# Patient Record
Sex: FEMALE | Race: WHITE | URBAN - METROPOLITAN AREA
[De-identification: names, ages, dates, MRNs, and addresses within clinical notes are randomized per-mention and may not be internally consistent; named-entity substitution may affect disease eponyms.]

---

## 2017-05-12 ENCOUNTER — OFFICE VISIT (OUTPATIENT)
Dept: FAMILY MEDICINE CLINIC | Age: 57
End: 2017-05-12

## 2017-05-12 VITALS
WEIGHT: 293 LBS | RESPIRATION RATE: 12 BRPM | HEART RATE: 93 BPM | TEMPERATURE: 98.5 F | OXYGEN SATURATION: 97 % | BODY MASS INDEX: 48.34 KG/M2 | SYSTOLIC BLOOD PRESSURE: 130 MMHG | DIASTOLIC BLOOD PRESSURE: 100 MMHG

## 2017-05-12 DIAGNOSIS — J45.21 ASTHMATIC BRONCHITIS, MILD INTERMITTENT, WITH ACUTE EXACERBATION: ICD-10-CM

## 2017-05-12 DIAGNOSIS — J01.10 ACUTE FRONTAL SINUSITIS, RECURRENCE NOT SPECIFIED: Primary | ICD-10-CM

## 2017-05-12 PROCEDURE — 99213 OFFICE O/P EST LOW 20 MIN: CPT | Performed by: FAMILY MEDICINE

## 2017-05-12 RX ORDER — CEPHALEXIN 500 MG/1
500 CAPSULE ORAL 3 TIMES DAILY
Qty: 30 CAPSULE | Refills: 0 | Status: SHIPPED | OUTPATIENT
Start: 2017-05-12 | End: 2017-10-05 | Stop reason: SDUPTHER

## 2017-05-12 RX ORDER — PREDNISONE 10 MG/1
TABLET ORAL
Qty: 27 TABLET | Refills: 0 | Status: SHIPPED | OUTPATIENT
Start: 2017-05-12 | End: 2017-05-17

## 2017-05-12 ASSESSMENT — ENCOUNTER SYMPTOMS
SORE THROAT: 1
WHEEZING: 1
COUGH: 1

## 2017-05-23 RX ORDER — LEVOFLOXACIN 500 MG/1
500 TABLET, FILM COATED ORAL DAILY
Qty: 7 TABLET | Refills: 0 | Status: SHIPPED | OUTPATIENT
Start: 2017-05-23 | End: 2018-02-06 | Stop reason: SDUPTHER

## 2017-10-05 ENCOUNTER — OFFICE VISIT (OUTPATIENT)
Dept: FAMILY MEDICINE CLINIC | Age: 57
End: 2017-10-05

## 2017-10-05 VITALS
HEART RATE: 86 BPM | TEMPERATURE: 98.9 F | OXYGEN SATURATION: 97 % | BODY MASS INDEX: 46.38 KG/M2 | DIASTOLIC BLOOD PRESSURE: 82 MMHG | WEIGHT: 283 LBS | SYSTOLIC BLOOD PRESSURE: 134 MMHG

## 2017-10-05 DIAGNOSIS — J45.31 ASTHMATIC BRONCHITIS, MILD PERSISTENT, WITH ACUTE EXACERBATION: Primary | ICD-10-CM

## 2017-10-05 PROCEDURE — 99213 OFFICE O/P EST LOW 20 MIN: CPT | Performed by: FAMILY MEDICINE

## 2017-10-05 RX ORDER — CEPHALEXIN 500 MG/1
500 CAPSULE ORAL 3 TIMES DAILY
Qty: 30 CAPSULE | Refills: 0 | Status: SHIPPED | OUTPATIENT
Start: 2017-10-05 | End: 2017-12-08 | Stop reason: SDUPTHER

## 2017-10-05 RX ORDER — PREDNISONE 20 MG/1
TABLET ORAL
Qty: 15 TABLET | Refills: 0 | Status: SHIPPED | OUTPATIENT
Start: 2017-10-05 | End: 2017-12-08

## 2017-10-05 ASSESSMENT — ENCOUNTER SYMPTOMS
WHEEZING: 1
COUGH: 1
SORE THROAT: 1

## 2017-10-05 NOTE — PROGRESS NOTES
Subjective:      Patient ID: Geovanny Richards is a 56 y.o. female. Patient presents for acute medical problem. Medical assistant notes reviewed. URI    This is a new problem. Episode onset: since Sept 29. Associated symptoms include congestion, coughing, headaches, a sore throat and wheezing. Associated symptoms comments: Post nasal drip, chest congestion, and nasal congestion. Patient active started with her symptoms 1 week ago. Her 's been ill at home as well. She initially start with coughing and wheezing and she now she has sinus congestion and drainage as well. She does report fevers at nighttime. Review of Systems   HENT: Positive for congestion and sore throat. Respiratory: Positive for cough and wheezing. Neurological: Positive for headaches. Allergies   Allergen Reactions    Sulfa Antibiotics          Objective:   Physical Exam   Constitutional: She appears well-developed and well-nourished. She is cooperative. HENT:   Right Ear: Ear canal normal. Tympanic membrane is erythematous. Left Ear: Ear canal normal. Tympanic membrane is erythematous. Nose: Mucosal edema and rhinorrhea present. Right sinus exhibits no maxillary sinus tenderness and no frontal sinus tenderness. Left sinus exhibits no maxillary sinus tenderness and no frontal sinus tenderness. Mouth/Throat: Oropharynx is clear and moist and mucous membranes are normal.   Neck: Neck supple. Pulmonary/Chest: Effort normal. No respiratory distress. She has no decreased breath sounds. She has wheezes. She has rhonchi. She has no rales. Lymphadenopathy:     She has no cervical adenopathy. Neurological: She is alert. Assessment:      Teresita Perez was seen today for uri. Diagnoses and all orders for this visit:    Asthmatic bronchitis, mild persistent, with acute exacerbation    Other orders  -     cephALEXin (KEFLEX) 500 MG capsule;  Take 1 capsule by mouth 3 times daily for 10 days  -     predniSONE (DELTASONE) 20 MG tablet; 1 TID for 3 day then 1 BID            Plan:      Continue with over-the-counter medication when necessary  RTC when necessary

## 2017-11-21 ENCOUNTER — TELEPHONE (OUTPATIENT)
Dept: FAMILY MEDICINE CLINIC | Age: 57
End: 2017-11-21

## 2017-11-21 NOTE — TELEPHONE ENCOUNTER
Patient has been called for jury duty and she has panic attacks and severe social anxiety. She is also taking care of her mother who has alzheimer's. Patient is requesting a letter from doctor stating that she has severe anxiety and has been medicated for this issue so she will be excused from jury duty.      Please call patient to advise  [phone]      If writing letter, please fax letter    [phone]

## 2017-11-21 NOTE — LETTER
[facility]  Phone: [phone]  Fax: [phone]    Valeriy Singer MD        November 22, 2017     Patient: Lyudmila Qureshi   YOB: 1960   Date of Visit: 11/21/2017       To Whom It May Concern: It is my medical opinion that Adele Green is unable to perform jury duty at this time due to medical reasons . If you have any questions or concerns, please don't hesitate to call.     Sincerely,        Valeriy Singer MD

## 2017-12-08 ENCOUNTER — OFFICE VISIT (OUTPATIENT)
Dept: FAMILY MEDICINE CLINIC | Age: 57
End: 2017-12-08

## 2017-12-08 VITALS
DIASTOLIC BLOOD PRESSURE: 72 MMHG | SYSTOLIC BLOOD PRESSURE: 100 MMHG | WEIGHT: 278.5 LBS | RESPIRATION RATE: 16 BRPM | OXYGEN SATURATION: 98 % | TEMPERATURE: 97.1 F | BODY MASS INDEX: 45.64 KG/M2 | HEART RATE: 71 BPM

## 2017-12-08 DIAGNOSIS — J45.41 MODERATE PERSISTENT ASTHMATIC BRONCHITIS WITH ACUTE EXACERBATION: Primary | ICD-10-CM

## 2017-12-08 PROCEDURE — 99213 OFFICE O/P EST LOW 20 MIN: CPT | Performed by: FAMILY MEDICINE

## 2017-12-08 RX ORDER — CEPHALEXIN 500 MG/1
500 CAPSULE ORAL 3 TIMES DAILY
Qty: 30 CAPSULE | Refills: 0 | Status: SHIPPED | OUTPATIENT
Start: 2017-12-08 | End: 2018-01-18 | Stop reason: SDUPTHER

## 2017-12-08 RX ORDER — PREDNISONE 20 MG/1
TABLET ORAL
Qty: 15 TABLET | Refills: 0 | Status: SHIPPED | OUTPATIENT
Start: 2017-12-08 | End: 2018-01-18 | Stop reason: SDUPTHER

## 2017-12-08 ASSESSMENT — ENCOUNTER SYMPTOMS: COUGH: 1

## 2018-01-18 ENCOUNTER — OFFICE VISIT (OUTPATIENT)
Dept: FAMILY MEDICINE CLINIC | Age: 58
End: 2018-01-18

## 2018-01-18 VITALS
OXYGEN SATURATION: 97 % | TEMPERATURE: 99.7 F | BODY MASS INDEX: 46.11 KG/M2 | HEART RATE: 89 BPM | RESPIRATION RATE: 16 BRPM | DIASTOLIC BLOOD PRESSURE: 90 MMHG | SYSTOLIC BLOOD PRESSURE: 120 MMHG | WEIGHT: 281.38 LBS

## 2018-01-18 DIAGNOSIS — J45.41 MODERATE PERSISTENT ASTHMATIC BRONCHITIS WITH ACUTE EXACERBATION: Primary | ICD-10-CM

## 2018-01-18 PROCEDURE — 99213 OFFICE O/P EST LOW 20 MIN: CPT | Performed by: FAMILY MEDICINE

## 2018-01-18 RX ORDER — PREDNISONE 20 MG/1
TABLET ORAL
Qty: 15 TABLET | Refills: 0 | Status: SHIPPED | OUTPATIENT
Start: 2018-01-18 | End: 2018-02-06

## 2018-01-18 RX ORDER — CEPHALEXIN 500 MG/1
500 CAPSULE ORAL 3 TIMES DAILY
Qty: 30 CAPSULE | Refills: 0 | Status: SHIPPED | OUTPATIENT
Start: 2018-01-18 | End: 2018-01-28

## 2018-01-18 ASSESSMENT — ENCOUNTER SYMPTOMS
WHEEZING: 1
SHORTNESS OF BREATH: 1
COUGH: 1
SORE THROAT: 1

## 2018-02-06 ENCOUNTER — OFFICE VISIT (OUTPATIENT)
Dept: FAMILY MEDICINE CLINIC | Age: 58
End: 2018-02-06

## 2018-02-06 VITALS
HEART RATE: 78 BPM | BODY MASS INDEX: 45.89 KG/M2 | SYSTOLIC BLOOD PRESSURE: 118 MMHG | DIASTOLIC BLOOD PRESSURE: 80 MMHG | TEMPERATURE: 99.4 F | OXYGEN SATURATION: 97 % | WEIGHT: 280 LBS

## 2018-02-06 DIAGNOSIS — J45.41 MODERATE PERSISTENT ASTHMATIC BRONCHITIS WITH ACUTE EXACERBATION: Primary | ICD-10-CM

## 2018-02-06 PROCEDURE — 99213 OFFICE O/P EST LOW 20 MIN: CPT | Performed by: FAMILY MEDICINE

## 2018-02-06 RX ORDER — LEVOFLOXACIN 500 MG/1
500 TABLET, FILM COATED ORAL DAILY
Qty: 10 TABLET | Refills: 0 | Status: SHIPPED | OUTPATIENT
Start: 2018-02-06 | End: 2018-02-16

## 2018-02-06 RX ORDER — PREDNISONE 20 MG/1
TABLET ORAL
Qty: 15 TABLET | Refills: 0 | Status: SHIPPED | OUTPATIENT
Start: 2018-02-06

## 2018-02-06 ASSESSMENT — ENCOUNTER SYMPTOMS
WHEEZING: 1
COUGH: 1

## 2018-02-06 NOTE — PROGRESS NOTES
Subjective:      Patient ID: Mayra Myers is a 56 y.o. female. Patient presents for acute medical problem. Medical assistant notes reviewed. URI    This is a new problem. Episode onset: about 5 days. There has been no fever. Associated symptoms include congestion, coughing, a plugged ear sensation and wheezing. Associated symptoms comments: Post nasal drip, chest congestion, shortness of breath. Patient just completed antibiotic therapy 8 days ago. She was doing better and developed an upper respiratory infection along with her . She now feels the respiratory infection is persistent but she's also back wheezing. Patient does have a history of intermittent asthma when she becomes ill. Review of Systems   HENT: Positive for congestion. Respiratory: Positive for cough and wheezing. Allergies   Allergen Reactions    Sulfa Antibiotics          Objective:   Physical Exam   Constitutional: She appears well-developed and well-nourished. She is cooperative. HENT:   Right Ear: Tympanic membrane and ear canal normal.   Left Ear: Tympanic membrane and ear canal normal.   Nose: Mucosal edema and rhinorrhea present. Right sinus exhibits no maxillary sinus tenderness and no frontal sinus tenderness. Left sinus exhibits no maxillary sinus tenderness and no frontal sinus tenderness. Mouth/Throat: Oropharynx is clear and moist and mucous membranes are normal.   Neck: Neck supple. Pulmonary/Chest: Effort normal. No respiratory distress. She has wheezes (diffuse). Lymphadenopathy:     She has no cervical adenopathy. Neurological: She is alert. Assessment:      Eddie Matias was seen today for uri. Diagnoses and all orders for this visit:    Moderate persistent asthmatic bronchitis with acute exacerbation    Other orders  -     levofloxacin (LEVAQUIN) 500 MG tablet;  Take 1 tablet by mouth daily for 10 days  -     predniSONE (DELTASONE) 20 MG tablet; 1 TID for 3 day then 1 BID            Plan: Discussed using preventative inhaler the next time she becomes ill  Patient does have springtime allergies and recommended Flonase nasal spray daily.   RTC when necessary

## 2019-11-04 ENCOUNTER — OFFICE VISIT (OUTPATIENT)
Dept: FAMILY MEDICINE CLINIC | Age: 59
End: 2019-11-04
Payer: COMMERCIAL

## 2019-11-04 VITALS — DIASTOLIC BLOOD PRESSURE: 80 MMHG | HEART RATE: 73 BPM | OXYGEN SATURATION: 98 % | SYSTOLIC BLOOD PRESSURE: 104 MMHG

## 2019-11-04 DIAGNOSIS — L57.0 ACTINIC KERATOSIS: ICD-10-CM

## 2019-11-04 DIAGNOSIS — M27.0 TORUS PALATINUS: ICD-10-CM

## 2019-11-04 DIAGNOSIS — S86.112A GASTROCNEMIUS MUSCLE STRAIN, LEFT, INITIAL ENCOUNTER: ICD-10-CM

## 2019-11-04 DIAGNOSIS — M70.52 PES ANSERINUS BURSITIS OF LEFT KNEE: Primary | ICD-10-CM

## 2019-11-04 PROCEDURE — 99213 OFFICE O/P EST LOW 20 MIN: CPT | Performed by: FAMILY MEDICINE

## 2019-11-04 PROCEDURE — 17000 DESTRUCT PREMALG LESION: CPT | Performed by: FAMILY MEDICINE

## 2020-01-29 ENCOUNTER — TELEPHONE (OUTPATIENT)
Dept: FAMILY MEDICINE CLINIC | Age: 60
End: 2020-01-29

## 2020-01-29 NOTE — TELEPHONE ENCOUNTER
Pt says she has pretty bad anxiety and they keep summoning her to [facility]. She is asking if you can provide documentation to excuse her due to anxiety?     Last appt  11/04/19    Please call PT  Thank you

## 2020-01-29 NOTE — LETTER
[facility] [address]  Phone: [phone]  Fax: [phone]    Nabil Calderon MD        January 29, 2020     Patient: Aisha Rucker   YOB: 1960   Date of Visit: 1/29/2020       To Whom It May Concern: It is my medical opinion that Jose Miguel Stoddard is unable to perform jury duty at this time due to medical reasons. If you have any questions or concerns, please don't hesitate to call.     Sincerely,        Nabil Calderon MD

## 2023-08-08 ENCOUNTER — OFFICE VISIT (OUTPATIENT)
Dept: FAMILY MEDICINE CLINIC | Age: 63
End: 2023-08-08
Payer: COMMERCIAL

## 2023-08-08 VITALS
DIASTOLIC BLOOD PRESSURE: 80 MMHG | HEART RATE: 76 BPM | OXYGEN SATURATION: 98 % | TEMPERATURE: 97.4 F | WEIGHT: 274.25 LBS | SYSTOLIC BLOOD PRESSURE: 122 MMHG | RESPIRATION RATE: 12 BRPM

## 2023-08-08 DIAGNOSIS — Z00.00 WELL ADULT EXAM: Primary | ICD-10-CM

## 2023-08-08 DIAGNOSIS — L82.1 SEBORRHEIC KERATOSIS: ICD-10-CM

## 2023-08-08 PROCEDURE — 99386 PREV VISIT NEW AGE 40-64: CPT | Performed by: FAMILY MEDICINE

## 2023-08-08 SDOH — ECONOMIC STABILITY: FOOD INSECURITY: WITHIN THE PAST 12 MONTHS, THE FOOD YOU BOUGHT JUST DIDN'T LAST AND YOU DIDN'T HAVE MONEY TO GET MORE.: NEVER TRUE

## 2023-08-08 SDOH — ECONOMIC STABILITY: FOOD INSECURITY: WITHIN THE PAST 12 MONTHS, YOU WORRIED THAT YOUR FOOD WOULD RUN OUT BEFORE YOU GOT MONEY TO BUY MORE.: NEVER TRUE

## 2023-08-08 SDOH — ECONOMIC STABILITY: HOUSING INSECURITY
IN THE LAST 12 MONTHS, WAS THERE A TIME WHEN YOU DID NOT HAVE A STEADY PLACE TO SLEEP OR SLEPT IN A SHELTER (INCLUDING NOW)?: NO

## 2023-08-08 SDOH — ECONOMIC STABILITY: INCOME INSECURITY: HOW HARD IS IT FOR YOU TO PAY FOR THE VERY BASICS LIKE FOOD, HOUSING, MEDICAL CARE, AND HEATING?: NOT HARD AT ALL

## 2023-08-08 ASSESSMENT — PATIENT HEALTH QUESTIONNAIRE - PHQ9
SUM OF ALL RESPONSES TO PHQ QUESTIONS 1-9: 0
SUM OF ALL RESPONSES TO PHQ QUESTIONS 1-9: 0
SUM OF ALL RESPONSES TO PHQ9 QUESTIONS 1 & 2: 0
SUM OF ALL RESPONSES TO PHQ QUESTIONS 1-9: 0
2. FEELING DOWN, DEPRESSED OR HOPELESS: 0
SUM OF ALL RESPONSES TO PHQ QUESTIONS 1-9: 0
1. LITTLE INTEREST OR PLEASURE IN DOING THINGS: 0

## 2023-08-08 NOTE — PROGRESS NOTES
Subjective:      Patient ID: Katie Pena is a 66 y.o. female. HPI Pt is here to have 2 moles on her face look at.  Pt states that they ave been there for a while, but she wants to make sure they are not cancerous     Review of Systems  Allergies   Allergen Reactions    Sulfa Antibiotics       Objective:   Physical Exam    Assessment:      ***      Plan:      ***
occurred. Patient was evaluated and examined. I performed the physical examination and key/critical portions of the history were approved and edited.  I also confirm that the note above accurately reflects all work, treatment, procedures, and medical decision making performed by me, Hector Gunderson M.D.

## 2023-09-20 DIAGNOSIS — Z00.00 WELL ADULT EXAM: ICD-10-CM

## 2023-09-20 LAB
ALBUMIN SERPL-MCNC: 4.4 G/DL (ref 3.4–5)
ALBUMIN/GLOB SERPL: 2.1 {RATIO} (ref 1.1–2.2)
ALP SERPL-CCNC: 68 U/L (ref 40–129)
ALT SERPL-CCNC: 32 U/L (ref 10–40)
ANION GAP SERPL CALCULATED.3IONS-SCNC: 12 MMOL/L (ref 3–16)
AST SERPL-CCNC: 24 U/L (ref 15–37)
BILIRUB SERPL-MCNC: 1.3 MG/DL (ref 0–1)
BUN SERPL-MCNC: 10 MG/DL (ref 7–20)
CALCIUM SERPL-MCNC: 8.9 MG/DL (ref 8.3–10.6)
CHLORIDE SERPL-SCNC: 107 MMOL/L (ref 99–110)
CHOLEST SERPL-MCNC: 311 MG/DL (ref 0–199)
CO2 SERPL-SCNC: 28 MMOL/L (ref 21–32)
CREAT SERPL-MCNC: 0.6 MG/DL (ref 0.6–1.2)
DEPRECATED RDW RBC AUTO: 13.7 % (ref 12.4–15.4)
GFR SERPLBLD CREATININE-BSD FMLA CKD-EPI: >60 ML/MIN/{1.73_M2}
GLUCOSE SERPL-MCNC: 123 MG/DL (ref 70–99)
HCT VFR BLD AUTO: 41.1 % (ref 36–48)
HDLC SERPL-MCNC: 58 MG/DL (ref 40–60)
HGB BLD-MCNC: 14.1 G/DL (ref 12–16)
LDLC SERPL CALC-MCNC: 233 MG/DL
MCH RBC QN AUTO: 31.8 PG (ref 26–34)
MCHC RBC AUTO-ENTMCNC: 34.4 G/DL (ref 31–36)
MCV RBC AUTO: 92.6 FL (ref 80–100)
PLATELET # BLD AUTO: 299 K/UL (ref 135–450)
PMV BLD AUTO: 8.3 FL (ref 5–10.5)
POTASSIUM SERPL-SCNC: 4.4 MMOL/L (ref 3.5–5.1)
PROT SERPL-MCNC: 6.5 G/DL (ref 6.4–8.2)
RBC # BLD AUTO: 4.44 M/UL (ref 4–5.2)
SODIUM SERPL-SCNC: 147 MMOL/L (ref 136–145)
TRIGL SERPL-MCNC: 101 MG/DL (ref 0–150)
VLDLC SERPL CALC-MCNC: 20 MG/DL
WBC # BLD AUTO: 7.8 K/UL (ref 4–11)

## 2023-09-21 ENCOUNTER — TELEPHONE (OUTPATIENT)
Dept: FAMILY MEDICINE CLINIC | Age: 63
End: 2023-09-21

## 2023-09-21 DIAGNOSIS — R73.9 HYPERGLYCEMIA: Primary | ICD-10-CM

## 2023-09-21 NOTE — TELEPHONE ENCOUNTER
----- Message from Susan Rowell MD sent at 9/21/2023  7:51 AM EDT -----  Laboratory testing demonstrates significant elevation of cholesterol over 300 and elevated blood sugar 123. Would recommend she proceed with a hemoglobin A1c with a diagnosis of hyperglycemia and then we can determine medical management of both of these issues.

## 2023-09-22 LAB
EST. AVERAGE GLUCOSE BLD GHB EST-MCNC: 114 MG/DL
HBA1C MFR BLD: 5.6 %

## 2023-11-06 ENCOUNTER — TELEPHONE (OUTPATIENT)
Dept: FAMILY MEDICINE CLINIC | Age: 63
End: 2023-11-06

## 2023-11-06 NOTE — TELEPHONE ENCOUNTER
Called Pt, no answer, LVM. Would Pt like to schedule mammo for Tue Dec 12 at [facility] ([facility])?

## 2024-08-13 ENCOUNTER — OFFICE VISIT (OUTPATIENT)
Dept: FAMILY MEDICINE CLINIC | Age: 64
End: 2024-08-13
Payer: COMMERCIAL

## 2024-08-13 VITALS
OXYGEN SATURATION: 97 % | WEIGHT: 267 LBS | TEMPERATURE: 97.6 F | DIASTOLIC BLOOD PRESSURE: 72 MMHG | SYSTOLIC BLOOD PRESSURE: 130 MMHG | RESPIRATION RATE: 16 BRPM | HEART RATE: 77 BPM

## 2024-08-13 DIAGNOSIS — Z00.00 WELL ADULT EXAM: Primary | ICD-10-CM

## 2024-08-13 DIAGNOSIS — J45.41 ASTHMATIC BRONCHITIS WITH EXACERBATION, MODERATE PERSISTENT: ICD-10-CM

## 2024-08-13 DIAGNOSIS — L98.9 SKIN LESION: ICD-10-CM

## 2024-08-13 PROCEDURE — 99396 PREV VISIT EST AGE 40-64: CPT | Performed by: FAMILY MEDICINE

## 2024-08-13 RX ORDER — PREDNISONE 20 MG/1
TABLET ORAL
Qty: 15 TABLET | Refills: 0 | Status: SHIPPED | OUTPATIENT
Start: 2024-08-13

## 2024-08-13 RX ORDER — CEFDINIR 300 MG/1
300 CAPSULE ORAL 2 TIMES DAILY
Qty: 14 CAPSULE | Refills: 0 | Status: SHIPPED | OUTPATIENT
Start: 2024-08-13 | End: 2024-08-20

## 2024-08-13 SDOH — ECONOMIC STABILITY: FOOD INSECURITY: WITHIN THE PAST 12 MONTHS, THE FOOD YOU BOUGHT JUST DIDN'T LAST AND YOU DIDN'T HAVE MONEY TO GET MORE.: NEVER TRUE

## 2024-08-13 SDOH — ECONOMIC STABILITY: FOOD INSECURITY: WITHIN THE PAST 12 MONTHS, YOU WORRIED THAT YOUR FOOD WOULD RUN OUT BEFORE YOU GOT MONEY TO BUY MORE.: NEVER TRUE

## 2024-08-13 SDOH — ECONOMIC STABILITY: INCOME INSECURITY: HOW HARD IS IT FOR YOU TO PAY FOR THE VERY BASICS LIKE FOOD, HOUSING, MEDICAL CARE, AND HEATING?: NOT HARD AT ALL

## 2024-08-13 ASSESSMENT — PATIENT HEALTH QUESTIONNAIRE - PHQ9
SUM OF ALL RESPONSES TO PHQ QUESTIONS 1-9: 0
SUM OF ALL RESPONSES TO PHQ QUESTIONS 1-9: 0
1. LITTLE INTEREST OR PLEASURE IN DOING THINGS: NOT AT ALL
2. FEELING DOWN, DEPRESSED OR HOPELESS: NOT AT ALL
SUM OF ALL RESPONSES TO PHQ QUESTIONS 1-9: 0
SUM OF ALL RESPONSES TO PHQ QUESTIONS 1-9: 0
SUM OF ALL RESPONSES TO PHQ9 QUESTIONS 1 & 2: 0

## 2024-08-13 NOTE — PROGRESS NOTES
medication will be done after laboratory testing results available.    Patient is to schedule a follow-up appointment for excision of the left neck skin lesion    Patient received counseling on the following healthy behaviors: nutrition and exercise     Patient given educational materials     Health maintenance updated    Discussed use, benefit, and side effects of prescribed medications.  Barriers to medication compliance addressed.  All patient questions answered.  Pt voiced understanding.     Patient needs RTC in one year for CPE and interim appointment for skin lesion removal.    Medical decision making of moderate complexity.    Please note that this chart was generated using Dragon dictation software. Although every effort was made to ensure the accuracy of this automated transcription, some errors in transcription may have occurred.      Patient was evaluated and examined. I performed the physical examination and key/critical portions of the history were approved and edited. I also confirm that the note above accurately reflects all work, treatment, procedures, and medical decision making performed by me, Mookie Payne M.D.

## 2024-08-29 ENCOUNTER — OFFICE VISIT (OUTPATIENT)
Dept: FAMILY MEDICINE CLINIC | Age: 64
End: 2024-08-29

## 2024-08-29 VITALS
OXYGEN SATURATION: 98 % | TEMPERATURE: 97.2 F | DIASTOLIC BLOOD PRESSURE: 92 MMHG | SYSTOLIC BLOOD PRESSURE: 170 MMHG | RESPIRATION RATE: 16 BRPM | HEART RATE: 118 BPM

## 2024-08-29 DIAGNOSIS — L98.9 SKIN LESION: Primary | ICD-10-CM

## 2024-08-29 NOTE — PROGRESS NOTES
Subjective   Patient ID: Edna Mcclain is a 64 y.o. female.    HPI Pt is here for a skin lesion/tag removal    Review of Systems     Allergies   Allergen Reactions    Sulfa Antibiotics         Objective   Physical Exam       Assessment   ***      Plan   ***

## 2024-08-29 NOTE — PROGRESS NOTES
Excision Biopsy Procedure Note    Pre-operative Diagnosis: Suspicious lesion    Post-operative Diagnosis: same    Locations: left  neck   1 cm X 0.6 cm    Anesthesia: Lidocaine 1% with epinephrine     Procedure Details   Patient informed of the risks, including bleeding and infection, and benefits of the   procedure and Verbal informed consent obtained. The lesion and surrounding area was given a sterile prep using chloraprep and draped in the usual sterile fashion. The skin was excised with elliptical incision perpendicular to the skin tension lines and the lesion removed.  Excision site was sutured using 5-0 suture.  Sterile pressure dressing applied. The specimen was sent for pathologic examination. The patient tolerated the procedure well.    Complications:  none.    Plan:  1. Instructed to keep the wound dry and covered for 24 hrs and clean thereafter with soap and water.  But no chlorine hot tubs or pool exposure to surgical site.   2. Warning signs of infection were reviewed.    3. Recommended that the patient use OTC analgesics as needed for pain.   4. Plan for RTC in 7-10 days.

## 2024-09-05 ENCOUNTER — PROCEDURE VISIT (OUTPATIENT)
Dept: FAMILY MEDICINE CLINIC | Age: 64
End: 2024-09-05

## 2024-09-05 VITALS
HEART RATE: 87 BPM | SYSTOLIC BLOOD PRESSURE: 132 MMHG | OXYGEN SATURATION: 97 % | RESPIRATION RATE: 22 BRPM | DIASTOLIC BLOOD PRESSURE: 84 MMHG | TEMPERATURE: 98.7 F

## 2024-09-05 DIAGNOSIS — L82.1 SEBORRHEIC KERATOSIS: Primary | ICD-10-CM

## 2024-09-05 PROCEDURE — 99024 POSTOP FOLLOW-UP VISIT: CPT | Performed by: FAMILY MEDICINE

## 2024-09-05 NOTE — PROGRESS NOTES
Pt presents for suture removal.    Physical Exam   /84 (Site: Right Lower Arm, Position: Sitting, Cuff Size: Large Adult)   Pulse 87   Temp 98.7 °F (37.1 °C) (Infrared)   Resp 22   SpO2 97%     Constitutional: She appears well-developed and well-nourished. No distress.     Biopsy results discussed with pt Yes  the wound appears well healed  Sutures removed without difficulty    Assessment: suture removal    Plan  Wound care instructions provided  Patient was instructed to be alert for any signs of cutaneous infection.  Follow-up as needed

## 2024-09-05 NOTE — PROGRESS NOTES
Subjective   Patient ID: Edna Mcclain is a 64 y.o. female.    HPI Pt presents for suture removal.    Physical Exam   /84 (Site: Right Lower Arm, Position: Sitting, Cuff Size: Large Adult)   Pulse 87   Temp 98.7 °F (37.1 °C) (Infrared)   Resp 22   SpO2 97%     Constitutional: She appears well-developed and well-nourished. No distress.     Biopsy results discussed with pt {YES/NO:19732}  {PROC SUTURE/STAPLE OTHER INFO:20297}  Sutures removed {PROC SUTURE/STAPLE REMOVAL WITH/WITHOUT DIFFICULTY:20296}    Assessment: suture removal    Plan  {INSTRUCTIONS:0094033880}     Review of Systems       Objective   Physical Exam       Assessment   ***      Plan   ***

## 2024-09-18 DIAGNOSIS — Z00.00 WELL ADULT EXAM: ICD-10-CM

## 2024-09-18 LAB
ALBUMIN SERPL-MCNC: 4.1 G/DL (ref 3.4–5)
ALBUMIN/GLOB SERPL: 2 {RATIO} (ref 1.1–2.2)
ALP SERPL-CCNC: 78 U/L (ref 40–129)
ALT SERPL-CCNC: 39 U/L (ref 10–40)
ANION GAP SERPL CALCULATED.3IONS-SCNC: 10 MMOL/L (ref 3–16)
AST SERPL-CCNC: 31 U/L (ref 15–37)
BILIRUB SERPL-MCNC: 1.4 MG/DL (ref 0–1)
BUN SERPL-MCNC: 8 MG/DL (ref 7–20)
CALCIUM SERPL-MCNC: 9.1 MG/DL (ref 8.3–10.6)
CHLORIDE SERPL-SCNC: 104 MMOL/L (ref 99–110)
CHOLEST SERPL-MCNC: 339 MG/DL (ref 0–199)
CO2 SERPL-SCNC: 27 MMOL/L (ref 21–32)
CREAT SERPL-MCNC: 0.7 MG/DL (ref 0.6–1.2)
EST. AVERAGE GLUCOSE BLD GHB EST-MCNC: 122.6 MG/DL
GFR SERPLBLD CREATININE-BSD FMLA CKD-EPI: >90 ML/MIN/{1.73_M2}
GLUCOSE SERPL-MCNC: 110 MG/DL (ref 70–99)
HBA1C MFR BLD: 5.9 %
HDLC SERPL-MCNC: 55 MG/DL (ref 40–60)
LDLC SERPL CALC-MCNC: 261 MG/DL
POTASSIUM SERPL-SCNC: 3.9 MMOL/L (ref 3.5–5.1)
PROT SERPL-MCNC: 6.2 G/DL (ref 6.4–8.2)
SODIUM SERPL-SCNC: 141 MMOL/L (ref 136–145)
TRIGL SERPL-MCNC: 117 MG/DL (ref 0–150)
VLDLC SERPL CALC-MCNC: 23 MG/DL

## 2025-04-24 ENCOUNTER — APPOINTMENT (OUTPATIENT)
Dept: GENERAL RADIOLOGY | Age: 65
End: 2025-04-24
Payer: COMMERCIAL

## 2025-04-24 ENCOUNTER — HOSPITAL ENCOUNTER (INPATIENT)
Age: 65
LOS: 9 days | Discharge: HOME | End: 2025-05-03
Attending: STUDENT IN AN ORGANIZED HEALTH CARE EDUCATION/TRAINING PROGRAM | Admitting: HOSPITALIST
Payer: COMMERCIAL

## 2025-04-24 ENCOUNTER — APPOINTMENT (OUTPATIENT)
Dept: CT IMAGING | Age: 65
End: 2025-04-24
Payer: COMMERCIAL

## 2025-04-24 DIAGNOSIS — S72.402A CLOSED FRACTURE OF DISTAL END OF LEFT FEMUR, UNSPECIFIED FRACTURE MORPHOLOGY, INITIAL ENCOUNTER (HCC): Primary | ICD-10-CM

## 2025-04-24 PROBLEM — S72.92XA CLOSED FRACTURE OF LEFT FEMUR, INITIAL ENCOUNTER (HCC): Status: ACTIVE | Noted: 2025-04-24

## 2025-04-24 LAB
ANION GAP SERPL CALCULATED.3IONS-SCNC: 12 MMOL/L (ref 3–16)
BASOPHILS # BLD: 0.1 K/UL (ref 0–0.2)
BASOPHILS NFR BLD: 0.4 %
BUN SERPL-MCNC: 13 MG/DL (ref 7–20)
CALCIUM SERPL-MCNC: 8.7 MG/DL (ref 8.3–10.6)
CHLORIDE SERPL-SCNC: 102 MMOL/L (ref 99–110)
CO2 SERPL-SCNC: 24 MMOL/L (ref 21–32)
CREAT SERPL-MCNC: 0.7 MG/DL (ref 0.6–1.2)
DEPRECATED RDW RBC AUTO: 13.5 % (ref 12.4–15.4)
EOSINOPHIL # BLD: 0.1 K/UL (ref 0–0.6)
EOSINOPHIL NFR BLD: 1 %
GFR SERPLBLD CREATININE-BSD FMLA CKD-EPI: >90 ML/MIN/{1.73_M2}
GLUCOSE SERPL-MCNC: 184 MG/DL (ref 70–99)
HCT VFR BLD AUTO: 40.3 % (ref 36–48)
HGB BLD-MCNC: 14 G/DL (ref 12–16)
LYMPHOCYTES # BLD: 2.3 K/UL (ref 1–5.1)
LYMPHOCYTES NFR BLD: 16 %
MCH RBC QN AUTO: 31.5 PG (ref 26–34)
MCHC RBC AUTO-ENTMCNC: 34.8 G/DL (ref 31–36)
MCV RBC AUTO: 90.5 FL (ref 80–100)
MONOCYTES # BLD: 0.7 K/UL (ref 0–1.3)
MONOCYTES NFR BLD: 4.6 %
NEUTROPHILS # BLD: 11.2 K/UL (ref 1.7–7.7)
NEUTROPHILS NFR BLD: 78 %
PLATELET # BLD AUTO: 253 K/UL (ref 135–450)
PMV BLD AUTO: 7.8 FL (ref 5–10.5)
POTASSIUM SERPL-SCNC: 3.7 MMOL/L (ref 3.5–5.1)
RBC # BLD AUTO: 4.45 M/UL (ref 4–5.2)
SODIUM SERPL-SCNC: 138 MMOL/L (ref 136–145)
WBC # BLD AUTO: 14.4 K/UL (ref 4–11)

## 2025-04-24 PROCEDURE — 6360000002 HC RX W HCPCS: Performed by: NURSE PRACTITIONER

## 2025-04-24 PROCEDURE — 96374 THER/PROPH/DIAG INJ IV PUSH: CPT

## 2025-04-24 PROCEDURE — 85025 COMPLETE CBC W/AUTO DIFF WBC: CPT

## 2025-04-24 PROCEDURE — 1200000000 HC SEMI PRIVATE

## 2025-04-24 PROCEDURE — 73560 X-RAY EXAM OF KNEE 1 OR 2: CPT

## 2025-04-24 PROCEDURE — 73552 X-RAY EXAM OF FEMUR 2/>: CPT

## 2025-04-24 PROCEDURE — 73700 CT LOWER EXTREMITY W/O DYE: CPT

## 2025-04-24 PROCEDURE — 99285 EMERGENCY DEPT VISIT HI MDM: CPT

## 2025-04-24 PROCEDURE — 80048 BASIC METABOLIC PNL TOTAL CA: CPT

## 2025-04-24 RX ORDER — ONDANSETRON 2 MG/ML
4 INJECTION INTRAMUSCULAR; INTRAVENOUS EVERY 6 HOURS PRN
Status: DISCONTINUED | OUTPATIENT
Start: 2025-04-24 | End: 2025-05-03 | Stop reason: HOSPADM

## 2025-04-24 RX ORDER — SODIUM CHLORIDE 9 MG/ML
INJECTION, SOLUTION INTRAVENOUS PRN
Status: DISCONTINUED | OUTPATIENT
Start: 2025-04-24 | End: 2025-04-24

## 2025-04-24 RX ORDER — SENNOSIDES A AND B 8.6 MG/1
1 TABLET, FILM COATED ORAL DAILY PRN
Status: DISCONTINUED | OUTPATIENT
Start: 2025-04-24 | End: 2025-05-03 | Stop reason: HOSPADM

## 2025-04-24 RX ORDER — OXYCODONE HYDROCHLORIDE 5 MG/1
5 TABLET ORAL ONCE
Refills: 0 | Status: DISCONTINUED | OUTPATIENT
Start: 2025-04-24 | End: 2025-04-24

## 2025-04-24 RX ORDER — ONDANSETRON 2 MG/ML
4 INJECTION INTRAMUSCULAR; INTRAVENOUS EVERY 30 MIN PRN
Status: DISCONTINUED | OUTPATIENT
Start: 2025-04-24 | End: 2025-04-24

## 2025-04-24 RX ORDER — HYDROMORPHONE HYDROCHLORIDE 1 MG/ML
0.5 INJECTION, SOLUTION INTRAMUSCULAR; INTRAVENOUS; SUBCUTANEOUS EVERY 4 HOURS PRN
Status: DISCONTINUED | OUTPATIENT
Start: 2025-04-24 | End: 2025-05-03 | Stop reason: HOSPADM

## 2025-04-24 RX ORDER — ACETAMINOPHEN 650 MG/1
650 SUPPOSITORY RECTAL EVERY 6 HOURS PRN
Status: DISCONTINUED | OUTPATIENT
Start: 2025-04-24 | End: 2025-05-03 | Stop reason: HOSPADM

## 2025-04-24 RX ORDER — TRAMADOL HYDROCHLORIDE 50 MG/1
50 TABLET ORAL EVERY 6 HOURS PRN
Status: DISCONTINUED | OUTPATIENT
Start: 2025-04-24 | End: 2025-05-03 | Stop reason: HOSPADM

## 2025-04-24 RX ORDER — ACETAMINOPHEN 325 MG/1
650 TABLET ORAL EVERY 6 HOURS PRN
Status: DISCONTINUED | OUTPATIENT
Start: 2025-04-24 | End: 2025-05-03 | Stop reason: HOSPADM

## 2025-04-24 RX ORDER — SODIUM CHLORIDE 0.9 % (FLUSH) 0.9 %
10 SYRINGE (ML) INJECTION PRN
Status: DISCONTINUED | OUTPATIENT
Start: 2025-04-24 | End: 2025-05-03 | Stop reason: HOSPADM

## 2025-04-24 RX ORDER — MORPHINE SULFATE 4 MG/ML
4 INJECTION, SOLUTION INTRAMUSCULAR; INTRAVENOUS ONCE
Refills: 0 | Status: COMPLETED | OUTPATIENT
Start: 2025-04-24 | End: 2025-04-24

## 2025-04-24 RX ADMIN — MORPHINE SULFATE 4 MG: 4 INJECTION INTRAVENOUS at 22:00

## 2025-04-24 ASSESSMENT — PAIN DESCRIPTION - PAIN TYPE: TYPE: ACUTE PAIN

## 2025-04-24 ASSESSMENT — PAIN DESCRIPTION - LOCATION: LOCATION: KNEE

## 2025-04-24 ASSESSMENT — PAIN SCALES - GENERAL
PAINLEVEL_OUTOF10: 4
PAINLEVEL_OUTOF10: 8

## 2025-04-24 ASSESSMENT — PAIN - FUNCTIONAL ASSESSMENT: PAIN_FUNCTIONAL_ASSESSMENT: 0-10

## 2025-04-24 ASSESSMENT — PAIN DESCRIPTION - ORIENTATION: ORIENTATION: LEFT

## 2025-04-25 ENCOUNTER — ANESTHESIA EVENT (OUTPATIENT)
Dept: OPERATING ROOM | Age: 65
End: 2025-04-25
Payer: COMMERCIAL

## 2025-04-25 ENCOUNTER — APPOINTMENT (OUTPATIENT)
Dept: GENERAL RADIOLOGY | Age: 65
End: 2025-04-25
Payer: COMMERCIAL

## 2025-04-25 ENCOUNTER — ANESTHESIA (OUTPATIENT)
Dept: OPERATING ROOM | Age: 65
End: 2025-04-25
Payer: COMMERCIAL

## 2025-04-25 LAB
ABO/RH: NORMAL
ALBUMIN SERPL-MCNC: 3.8 G/DL (ref 3.4–5)
ALP SERPL-CCNC: 72 U/L (ref 40–129)
ALT SERPL-CCNC: 23 U/L (ref 10–40)
ANION GAP SERPL CALCULATED.3IONS-SCNC: 11 MMOL/L (ref 3–16)
ANTIBODY SCREEN: NORMAL
AST SERPL-CCNC: 29 U/L (ref 15–37)
BASOPHILS # BLD: 0 K/UL (ref 0–0.2)
BASOPHILS NFR BLD: 0.3 %
BILIRUB DIRECT SERPL-MCNC: 0.4 MG/DL (ref 0–0.3)
BILIRUB INDIRECT SERPL-MCNC: 0.7 MG/DL (ref 0–1)
BILIRUB SERPL-MCNC: 1.1 MG/DL (ref 0–1)
BUN SERPL-MCNC: 13 MG/DL (ref 7–20)
CALCIUM SERPL-MCNC: 8.8 MG/DL (ref 8.3–10.6)
CHLORIDE SERPL-SCNC: 103 MMOL/L (ref 99–110)
CHOLEST SERPL-MCNC: 238 MG/DL (ref 0–199)
CO2 SERPL-SCNC: 23 MMOL/L (ref 21–32)
CREAT SERPL-MCNC: 0.6 MG/DL (ref 0.6–1.2)
DEPRECATED RDW RBC AUTO: 13.3 % (ref 12.4–15.4)
EOSINOPHIL # BLD: 0 K/UL (ref 0–0.6)
EOSINOPHIL NFR BLD: 0 %
GFR SERPLBLD CREATININE-BSD FMLA CKD-EPI: >90 ML/MIN/{1.73_M2}
GLUCOSE BLD-MCNC: 230 MG/DL (ref 70–99)
GLUCOSE SERPL-MCNC: 203 MG/DL (ref 70–99)
HCT VFR BLD AUTO: 39.4 % (ref 36–48)
HDLC SERPL-MCNC: 49 MG/DL (ref 40–60)
HGB BLD-MCNC: 13.5 G/DL (ref 12–16)
INR PPP: 1.05 (ref 0.85–1.15)
LDLC SERPL CALC-MCNC: 178 MG/DL
LYMPHOCYTES # BLD: 1.1 K/UL (ref 1–5.1)
LYMPHOCYTES NFR BLD: 6.7 %
MCH RBC QN AUTO: 30.9 PG (ref 26–34)
MCHC RBC AUTO-ENTMCNC: 34.3 G/DL (ref 31–36)
MCV RBC AUTO: 90.1 FL (ref 80–100)
MONOCYTES # BLD: 0.5 K/UL (ref 0–1.3)
MONOCYTES NFR BLD: 2.7 %
NEUTROPHILS # BLD: 15.5 K/UL (ref 1.7–7.7)
NEUTROPHILS NFR BLD: 90.3 %
PERFORMED ON: ABNORMAL
PLATELET # BLD AUTO: 244 K/UL (ref 135–450)
PMV BLD AUTO: 7.6 FL (ref 5–10.5)
POTASSIUM SERPL-SCNC: 3.7 MMOL/L (ref 3.5–5.1)
PROT SERPL-MCNC: 6.2 G/DL (ref 6.4–8.2)
PROTHROMBIN TIME: 13.9 SEC (ref 11.9–14.9)
RBC # BLD AUTO: 4.37 M/UL (ref 4–5.2)
SODIUM SERPL-SCNC: 137 MMOL/L (ref 136–145)
TRIGL SERPL-MCNC: 53 MG/DL (ref 0–150)
VLDLC SERPL CALC-MCNC: 11 MG/DL
WBC # BLD AUTO: 17.1 K/UL (ref 4–11)

## 2025-04-25 PROCEDURE — 6360000002 HC RX W HCPCS: Performed by: NURSE ANESTHETIST, CERTIFIED REGISTERED

## 2025-04-25 PROCEDURE — 6360000002 HC RX W HCPCS: Performed by: INTERNAL MEDICINE

## 2025-04-25 PROCEDURE — 2500000003 HC RX 250 WO HCPCS: Performed by: ANESTHESIOLOGY

## 2025-04-25 PROCEDURE — 6360000002 HC RX W HCPCS: Performed by: ANESTHESIOLOGY

## 2025-04-25 PROCEDURE — 86850 RBC ANTIBODY SCREEN: CPT

## 2025-04-25 PROCEDURE — 2500000003 HC RX 250 WO HCPCS: Performed by: NURSE ANESTHETIST, CERTIFIED REGISTERED

## 2025-04-25 PROCEDURE — 7100000001 HC PACU RECOVERY - ADDTL 15 MIN: Performed by: ORTHOPAEDIC SURGERY

## 2025-04-25 PROCEDURE — 85610 PROTHROMBIN TIME: CPT

## 2025-04-25 PROCEDURE — 85025 COMPLETE CBC W/AUTO DIFF WBC: CPT

## 2025-04-25 PROCEDURE — 93005 ELECTROCARDIOGRAM TRACING: CPT | Performed by: HOSPITALIST

## 2025-04-25 PROCEDURE — 2580000003 HC RX 258: Performed by: NURSE PRACTITIONER

## 2025-04-25 PROCEDURE — 6370000000 HC RX 637 (ALT 250 FOR IP): Performed by: INTERNAL MEDICINE

## 2025-04-25 PROCEDURE — 3600000014 HC SURGERY LEVEL 4 ADDTL 15MIN: Performed by: ORTHOPAEDIC SURGERY

## 2025-04-25 PROCEDURE — 2580000003 HC RX 258: Performed by: NURSE ANESTHETIST, CERTIFIED REGISTERED

## 2025-04-25 PROCEDURE — 6360000002 HC RX W HCPCS: Performed by: NURSE PRACTITIONER

## 2025-04-25 PROCEDURE — 27506 TREATMENT OF THIGH FRACTURE: CPT | Performed by: ORTHOPAEDIC SURGERY

## 2025-04-25 PROCEDURE — 2580000003 HC RX 258: Performed by: INTERNAL MEDICINE

## 2025-04-25 PROCEDURE — 73502 X-RAY EXAM HIP UNI 2-3 VIEWS: CPT

## 2025-04-25 PROCEDURE — 86900 BLOOD TYPING SEROLOGIC ABO: CPT

## 2025-04-25 PROCEDURE — 36415 COLL VENOUS BLD VENIPUNCTURE: CPT

## 2025-04-25 PROCEDURE — 86901 BLOOD TYPING SEROLOGIC RH(D): CPT

## 2025-04-25 PROCEDURE — 2500000003 HC RX 250 WO HCPCS: Performed by: ORTHOPAEDIC SURGERY

## 2025-04-25 PROCEDURE — 7100000000 HC PACU RECOVERY - FIRST 15 MIN: Performed by: ORTHOPAEDIC SURGERY

## 2025-04-25 PROCEDURE — 6370000000 HC RX 637 (ALT 250 FOR IP): Performed by: NURSE PRACTITIONER

## 2025-04-25 PROCEDURE — 80061 LIPID PANEL: CPT

## 2025-04-25 PROCEDURE — 3600000004 HC SURGERY LEVEL 4 BASE: Performed by: ORTHOPAEDIC SURGERY

## 2025-04-25 PROCEDURE — 2709999900 HC NON-CHARGEABLE SUPPLY: Performed by: ORTHOPAEDIC SURGERY

## 2025-04-25 PROCEDURE — 3700000001 HC ADD 15 MINUTES (ANESTHESIA): Performed by: ORTHOPAEDIC SURGERY

## 2025-04-25 PROCEDURE — C1713 ANCHOR/SCREW BN/BN,TIS/BN: HCPCS | Performed by: ORTHOPAEDIC SURGERY

## 2025-04-25 PROCEDURE — 93005 ELECTROCARDIOGRAM TRACING: CPT | Performed by: INTERNAL MEDICINE

## 2025-04-25 PROCEDURE — 80048 BASIC METABOLIC PNL TOTAL CA: CPT

## 2025-04-25 PROCEDURE — 6360000002 HC RX W HCPCS: Performed by: ORTHOPAEDIC SURGERY

## 2025-04-25 PROCEDURE — 2720000010 HC SURG SUPPLY STERILE: Performed by: ORTHOPAEDIC SURGERY

## 2025-04-25 PROCEDURE — 80076 HEPATIC FUNCTION PANEL: CPT

## 2025-04-25 PROCEDURE — 2580000003 HC RX 258: Performed by: ORTHOPAEDIC SURGERY

## 2025-04-25 PROCEDURE — 99223 1ST HOSP IP/OBS HIGH 75: CPT | Performed by: ORTHOPAEDIC SURGERY

## 2025-04-25 PROCEDURE — 2000000000 HC ICU R&B

## 2025-04-25 PROCEDURE — 3700000000 HC ANESTHESIA ATTENDED CARE: Performed by: ORTHOPAEDIC SURGERY

## 2025-04-25 PROCEDURE — C1769 GUIDE WIRE: HCPCS | Performed by: ORTHOPAEDIC SURGERY

## 2025-04-25 PROCEDURE — 6360000002 HC RX W HCPCS

## 2025-04-25 DEVICE — ADVANCED LOCKING SCREW: Type: IMPLANTABLE DEVICE | Site: LEG | Status: FUNCTIONAL

## 2025-04-25 DEVICE — CANNULATED SCREW
Type: IMPLANTABLE DEVICE | Site: LEG | Status: FUNCTIONAL
Brand: ASNIS

## 2025-04-25 DEVICE — IMPLANTABLE DEVICE
Type: IMPLANTABLE DEVICE | Site: LEG | Status: FUNCTIONAL
Brand: T2

## 2025-04-25 DEVICE — LOCKING SCREW
Type: IMPLANTABLE DEVICE | Site: LEG | Status: FUNCTIONAL
Brand: T2 ALPHA

## 2025-04-25 RX ORDER — FENTANYL CITRATE 50 UG/ML
INJECTION, SOLUTION INTRAMUSCULAR; INTRAVENOUS
Status: DISCONTINUED | OUTPATIENT
Start: 2025-04-25 | End: 2025-04-25 | Stop reason: SDUPTHER

## 2025-04-25 RX ORDER — HALOPERIDOL 5 MG/ML
INJECTION INTRAMUSCULAR
Status: COMPLETED
Start: 2025-04-25 | End: 2025-04-25

## 2025-04-25 RX ORDER — OXYCODONE HYDROCHLORIDE 5 MG/1
5 TABLET ORAL
Status: DISCONTINUED | OUTPATIENT
Start: 2025-04-25 | End: 2025-04-25 | Stop reason: HOSPADM

## 2025-04-25 RX ORDER — HYDRALAZINE HYDROCHLORIDE 20 MG/ML
10 INJECTION INTRAMUSCULAR; INTRAVENOUS
Status: DISCONTINUED | OUTPATIENT
Start: 2025-04-25 | End: 2025-04-25 | Stop reason: HOSPADM

## 2025-04-25 RX ORDER — HYDROMORPHONE HYDROCHLORIDE 2 MG/ML
0.5 INJECTION, SOLUTION INTRAMUSCULAR; INTRAVENOUS; SUBCUTANEOUS EVERY 5 MIN PRN
Status: DISCONTINUED | OUTPATIENT
Start: 2025-04-25 | End: 2025-04-25 | Stop reason: HOSPADM

## 2025-04-25 RX ORDER — MIDAZOLAM HYDROCHLORIDE 2 MG/2ML
0.5 INJECTION, SOLUTION INTRAMUSCULAR; INTRAVENOUS ONCE
Status: COMPLETED | OUTPATIENT
Start: 2025-04-25 | End: 2025-04-25

## 2025-04-25 RX ORDER — MAGNESIUM HYDROXIDE 1200 MG/15ML
LIQUID ORAL CONTINUOUS PRN
Status: COMPLETED | OUTPATIENT
Start: 2025-04-25 | End: 2025-04-25

## 2025-04-25 RX ORDER — SODIUM CHLORIDE 0.9 % (FLUSH) 0.9 %
5-40 SYRINGE (ML) INJECTION PRN
Status: DISCONTINUED | OUTPATIENT
Start: 2025-04-25 | End: 2025-04-25 | Stop reason: HOSPADM

## 2025-04-25 RX ORDER — DEXMEDETOMIDINE HYDROCHLORIDE 4 UG/ML
.1-1.5 INJECTION, SOLUTION INTRAVENOUS CONTINUOUS
Status: DISCONTINUED | OUTPATIENT
Start: 2025-04-25 | End: 2025-04-27

## 2025-04-25 RX ORDER — SODIUM CHLORIDE 9 MG/ML
INJECTION, SOLUTION INTRAVENOUS PRN
Status: DISCONTINUED | OUTPATIENT
Start: 2025-04-25 | End: 2025-04-25 | Stop reason: HOSPADM

## 2025-04-25 RX ORDER — LORAZEPAM 2 MG/ML
1 INJECTION INTRAMUSCULAR ONCE
Status: COMPLETED | OUTPATIENT
Start: 2025-04-25 | End: 2025-04-25

## 2025-04-25 RX ORDER — METHOCARBAMOL 100 MG/ML
1000 INJECTION, SOLUTION INTRAMUSCULAR; INTRAVENOUS ONCE
Status: COMPLETED | OUTPATIENT
Start: 2025-04-25 | End: 2025-04-25

## 2025-04-25 RX ORDER — HALOPERIDOL 5 MG/ML
5 INJECTION INTRAMUSCULAR ONCE
Status: COMPLETED | OUTPATIENT
Start: 2025-04-25 | End: 2025-04-25

## 2025-04-25 RX ORDER — LIDOCAINE HYDROCHLORIDE 20 MG/ML
INJECTION, SOLUTION EPIDURAL; INFILTRATION; INTRACAUDAL; PERINEURAL
Status: DISCONTINUED | OUTPATIENT
Start: 2025-04-25 | End: 2025-04-25 | Stop reason: SDUPTHER

## 2025-04-25 RX ORDER — SODIUM CHLORIDE 9 MG/ML
INJECTION, SOLUTION INTRAVENOUS CONTINUOUS
Status: DISCONTINUED | OUTPATIENT
Start: 2025-04-25 | End: 2025-04-27

## 2025-04-25 RX ORDER — LABETALOL HYDROCHLORIDE 5 MG/ML
10 INJECTION, SOLUTION INTRAVENOUS
Status: DISCONTINUED | OUTPATIENT
Start: 2025-04-25 | End: 2025-04-25 | Stop reason: HOSPADM

## 2025-04-25 RX ORDER — HYDROMORPHONE HYDROCHLORIDE 2 MG/ML
INJECTION, SOLUTION INTRAMUSCULAR; INTRAVENOUS; SUBCUTANEOUS
Status: DISCONTINUED | OUTPATIENT
Start: 2025-04-25 | End: 2025-04-25 | Stop reason: SDUPTHER

## 2025-04-25 RX ORDER — ESMOLOL HYDROCHLORIDE 10 MG/ML
INJECTION INTRAVENOUS
Status: DISCONTINUED | OUTPATIENT
Start: 2025-04-25 | End: 2025-04-25 | Stop reason: SDUPTHER

## 2025-04-25 RX ORDER — MEPERIDINE HYDROCHLORIDE 25 MG/ML
12.5 INJECTION INTRAMUSCULAR; INTRAVENOUS; SUBCUTANEOUS EVERY 5 MIN PRN
Status: DISCONTINUED | OUTPATIENT
Start: 2025-04-25 | End: 2025-04-25 | Stop reason: HOSPADM

## 2025-04-25 RX ORDER — BUPIVACAINE HYDROCHLORIDE AND EPINEPHRINE 5; 5 MG/ML; UG/ML
INJECTION, SOLUTION EPIDURAL; INTRACAUDAL; PERINEURAL
Status: DISCONTINUED | OUTPATIENT
Start: 2025-04-25 | End: 2025-04-25 | Stop reason: ALTCHOICE

## 2025-04-25 RX ORDER — NALOXONE HYDROCHLORIDE 0.4 MG/ML
INJECTION, SOLUTION INTRAMUSCULAR; INTRAVENOUS; SUBCUTANEOUS PRN
Status: DISCONTINUED | OUTPATIENT
Start: 2025-04-25 | End: 2025-04-25 | Stop reason: HOSPADM

## 2025-04-25 RX ORDER — DEXMEDETOMIDINE HYDROCHLORIDE 100 UG/ML
INJECTION, SOLUTION INTRAVENOUS
Status: DISCONTINUED | OUTPATIENT
Start: 2025-04-25 | End: 2025-04-25 | Stop reason: SDUPTHER

## 2025-04-25 RX ORDER — INSULIN LISPRO 100 [IU]/ML
0-4 INJECTION, SOLUTION INTRAVENOUS; SUBCUTANEOUS
Status: DISCONTINUED | OUTPATIENT
Start: 2025-04-25 | End: 2025-05-03 | Stop reason: HOSPADM

## 2025-04-25 RX ORDER — GLYCOPYRROLATE 0.2 MG/ML
INJECTION INTRAMUSCULAR; INTRAVENOUS
Status: DISCONTINUED | OUTPATIENT
Start: 2025-04-25 | End: 2025-04-25 | Stop reason: SDUPTHER

## 2025-04-25 RX ORDER — PROPOFOL 10 MG/ML
INJECTION, EMULSION INTRAVENOUS
Status: DISCONTINUED | OUTPATIENT
Start: 2025-04-25 | End: 2025-04-25 | Stop reason: SDUPTHER

## 2025-04-25 RX ORDER — ONDANSETRON 2 MG/ML
INJECTION INTRAMUSCULAR; INTRAVENOUS
Status: DISCONTINUED | OUTPATIENT
Start: 2025-04-25 | End: 2025-04-25 | Stop reason: SDUPTHER

## 2025-04-25 RX ORDER — GLUCAGON 1 MG/ML
1 KIT INJECTION PRN
Status: DISCONTINUED | OUTPATIENT
Start: 2025-04-25 | End: 2025-05-03 | Stop reason: HOSPADM

## 2025-04-25 RX ORDER — ONDANSETRON 2 MG/ML
4 INJECTION INTRAMUSCULAR; INTRAVENOUS
Status: COMPLETED | OUTPATIENT
Start: 2025-04-25 | End: 2025-04-25

## 2025-04-25 RX ORDER — CEFAZOLIN 2 G/1
INJECTION, POWDER, FOR SOLUTION INTRAMUSCULAR; INTRAVENOUS
Status: DISPENSED
Start: 2025-04-25 | End: 2025-04-26

## 2025-04-25 RX ORDER — PROCHLORPERAZINE EDISYLATE 5 MG/ML
5 INJECTION INTRAMUSCULAR; INTRAVENOUS
Status: DISCONTINUED | OUTPATIENT
Start: 2025-04-25 | End: 2025-04-25 | Stop reason: HOSPADM

## 2025-04-25 RX ORDER — SODIUM CHLORIDE 9 MG/ML
INJECTION, SOLUTION INTRAVENOUS
Status: DISCONTINUED | OUTPATIENT
Start: 2025-04-25 | End: 2025-04-25 | Stop reason: SDUPTHER

## 2025-04-25 RX ORDER — TRANEXAMIC ACID 100 MG/ML
INJECTION, SOLUTION INTRAVENOUS
Status: DISCONTINUED | OUTPATIENT
Start: 2025-04-25 | End: 2025-04-25 | Stop reason: SDUPTHER

## 2025-04-25 RX ORDER — HALOPERIDOL 5 MG/ML
INJECTION INTRAMUSCULAR
Status: DISPENSED
Start: 2025-04-25 | End: 2025-04-26

## 2025-04-25 RX ORDER — METHOCARBAMOL 100 MG/ML
INJECTION, SOLUTION INTRAMUSCULAR; INTRAVENOUS
Status: COMPLETED
Start: 2025-04-25 | End: 2025-04-25

## 2025-04-25 RX ORDER — PHENYLEPHRINE HCL IN 0.9% NACL 1 MG/10 ML
SYRINGE (ML) INTRAVENOUS
Status: DISCONTINUED | OUTPATIENT
Start: 2025-04-25 | End: 2025-04-25 | Stop reason: SDUPTHER

## 2025-04-25 RX ORDER — DEXTROSE MONOHYDRATE 100 MG/ML
INJECTION, SOLUTION INTRAVENOUS CONTINUOUS PRN
Status: DISCONTINUED | OUTPATIENT
Start: 2025-04-25 | End: 2025-05-03 | Stop reason: HOSPADM

## 2025-04-25 RX ORDER — SUCCINYLCHOLINE/SOD CL,ISO/PF 200MG/10ML
SYRINGE (ML) INTRAVENOUS
Status: DISCONTINUED | OUTPATIENT
Start: 2025-04-25 | End: 2025-04-25 | Stop reason: SDUPTHER

## 2025-04-25 RX ORDER — HYDRALAZINE HYDROCHLORIDE 20 MG/ML
10 INJECTION INTRAMUSCULAR; INTRAVENOUS EVERY 6 HOURS PRN
Status: DISCONTINUED | OUTPATIENT
Start: 2025-04-25 | End: 2025-05-03 | Stop reason: HOSPADM

## 2025-04-25 RX ORDER — SODIUM CHLORIDE 0.9 % (FLUSH) 0.9 %
5-40 SYRINGE (ML) INJECTION EVERY 12 HOURS SCHEDULED
Status: DISCONTINUED | OUTPATIENT
Start: 2025-04-25 | End: 2025-04-25 | Stop reason: HOSPADM

## 2025-04-25 RX ORDER — ROCURONIUM BROMIDE 10 MG/ML
INJECTION, SOLUTION INTRAVENOUS
Status: DISCONTINUED | OUTPATIENT
Start: 2025-04-25 | End: 2025-04-25 | Stop reason: SDUPTHER

## 2025-04-25 RX ORDER — DEXAMETHASONE SODIUM PHOSPHATE 4 MG/ML
INJECTION, SOLUTION INTRA-ARTICULAR; INTRALESIONAL; INTRAMUSCULAR; INTRAVENOUS; SOFT TISSUE
Status: DISCONTINUED | OUTPATIENT
Start: 2025-04-25 | End: 2025-04-25 | Stop reason: SDUPTHER

## 2025-04-25 RX ADMIN — GLYCOPYRROLATE 0.4 MG: 0.2 INJECTION, SOLUTION INTRAMUSCULAR; INTRAVENOUS at 17:22

## 2025-04-25 RX ADMIN — FAMOTIDINE 20 MG: 10 INJECTION, SOLUTION INTRAVENOUS at 11:09

## 2025-04-25 RX ADMIN — ONDANSETRON 4 MG: 2 INJECTION, SOLUTION INTRAMUSCULAR; INTRAVENOUS at 17:58

## 2025-04-25 RX ADMIN — SODIUM CHLORIDE: 0.9 INJECTION, SOLUTION INTRAVENOUS at 11:20

## 2025-04-25 RX ADMIN — ESMOLOL HYDROCHLORIDE 10 MG: 10 INJECTION, SOLUTION INTRAVENOUS at 17:49

## 2025-04-25 RX ADMIN — Medication 100 MCG: at 15:41

## 2025-04-25 RX ADMIN — ROCURONIUM BROMIDE 40 MG: 10 INJECTION, SOLUTION INTRAVENOUS at 15:13

## 2025-04-25 RX ADMIN — SODIUM CHLORIDE: 9 INJECTION, SOLUTION INTRAVENOUS at 14:51

## 2025-04-25 RX ADMIN — TRANEXAMIC ACID 1000 MG: 1 INJECTION, SOLUTION INTRAVENOUS at 14:51

## 2025-04-25 RX ADMIN — METHOCARBAMOL 1000 MG: 100 INJECTION INTRAMUSCULAR; INTRAVENOUS at 18:28

## 2025-04-25 RX ADMIN — ROCURONIUM BROMIDE 10 MG: 10 INJECTION, SOLUTION INTRAVENOUS at 16:11

## 2025-04-25 RX ADMIN — HYDROMORPHONE HYDROCHLORIDE 0.5 MG: 1 INJECTION, SOLUTION INTRAMUSCULAR; INTRAVENOUS; SUBCUTANEOUS at 03:22

## 2025-04-25 RX ADMIN — FENTANYL CITRATE 100 MCG: 50 INJECTION, SOLUTION INTRAMUSCULAR; INTRAVENOUS at 15:04

## 2025-04-25 RX ADMIN — ROCURONIUM BROMIDE 10 MG: 10 INJECTION, SOLUTION INTRAVENOUS at 15:04

## 2025-04-25 RX ADMIN — SODIUM CHLORIDE: 9 INJECTION, SOLUTION INTRAVENOUS at 17:07

## 2025-04-25 RX ADMIN — Medication 160 MG: at 15:04

## 2025-04-25 RX ADMIN — ESMOLOL HYDROCHLORIDE 10 MG: 10 INJECTION, SOLUTION INTRAVENOUS at 17:47

## 2025-04-25 RX ADMIN — SODIUM CHLORIDE 3000 MG: 9 INJECTION, SOLUTION INTRAVENOUS at 15:05

## 2025-04-25 RX ADMIN — ESMOLOL HYDROCHLORIDE 10 MG: 10 INJECTION, SOLUTION INTRAVENOUS at 17:44

## 2025-04-25 RX ADMIN — ROCURONIUM BROMIDE 20 MG: 10 INJECTION, SOLUTION INTRAVENOUS at 15:33

## 2025-04-25 RX ADMIN — LORAZEPAM 1 MG: 2 INJECTION INTRAMUSCULAR; INTRAVENOUS at 19:37

## 2025-04-25 RX ADMIN — HYDRALAZINE HYDROCHLORIDE 10 MG: 20 INJECTION INTRAMUSCULAR; INTRAVENOUS at 03:22

## 2025-04-25 RX ADMIN — METHOCARBAMOL 1000 MG: 100 INJECTION, SOLUTION INTRAMUSCULAR; INTRAVENOUS at 18:28

## 2025-04-25 RX ADMIN — HALOPERIDOL LACTATE 5 MG: 5 INJECTION, SOLUTION INTRAMUSCULAR at 18:27

## 2025-04-25 RX ADMIN — DEXMEDETOMIDINE HYDROCHLORIDE 4 MCG: 100 INJECTION, SOLUTION INTRAVENOUS at 17:46

## 2025-04-25 RX ADMIN — MIDAZOLAM 0.5 MG: 1 INJECTION INTRAMUSCULAR; INTRAVENOUS at 19:11

## 2025-04-25 RX ADMIN — LIDOCAINE HYDROCHLORIDE 100 MG: 20 INJECTION, SOLUTION EPIDURAL; INFILTRATION; INTRACAUDAL; PERINEURAL at 15:04

## 2025-04-25 RX ADMIN — DEXMEDETOMIDINE HYDROCHLORIDE 4 MCG: 100 INJECTION, SOLUTION INTRAVENOUS at 17:50

## 2025-04-25 RX ADMIN — ESMOLOL HYDROCHLORIDE 10 MG: 10 INJECTION, SOLUTION INTRAVENOUS at 17:51

## 2025-04-25 RX ADMIN — DEXAMETHASONE SODIUM PHOSPHATE 4 MG: 4 INJECTION, SOLUTION INTRAMUSCULAR; INTRAVENOUS at 17:12

## 2025-04-25 RX ADMIN — FAMOTIDINE 20 MG: 10 INJECTION, SOLUTION INTRAVENOUS at 00:16

## 2025-04-25 RX ADMIN — HYDROMORPHONE HYDROCHLORIDE 1 MG: 2 INJECTION, SOLUTION INTRAMUSCULAR; INTRAVENOUS; SUBCUTANEOUS at 15:34

## 2025-04-25 RX ADMIN — ONDANSETRON 4 MG: 2 INJECTION, SOLUTION INTRAMUSCULAR; INTRAVENOUS at 17:12

## 2025-04-25 RX ADMIN — TRAMADOL HYDROCHLORIDE 50 MG: 50 TABLET, COATED ORAL at 00:15

## 2025-04-25 RX ADMIN — HYDROMORPHONE HYDROCHLORIDE 1 MG: 2 INJECTION, SOLUTION INTRAMUSCULAR; INTRAVENOUS; SUBCUTANEOUS at 17:14

## 2025-04-25 RX ADMIN — DEXMEDETOMIDINE HYDROCHLORIDE 0.2 MCG/KG/HR: 400 INJECTION, SOLUTION INTRAVENOUS at 20:45

## 2025-04-25 RX ADMIN — Medication 100 MCG: at 16:27

## 2025-04-25 RX ADMIN — HALOPERIDOL LACTATE 5 MG: 5 INJECTION, SOLUTION INTRAMUSCULAR at 18:44

## 2025-04-25 RX ADMIN — MIDAZOLAM 0.5 MG: 1 INJECTION INTRAMUSCULAR; INTRAVENOUS at 18:55

## 2025-04-25 RX ADMIN — SUGAMMADEX 200 MG: 100 INJECTION, SOLUTION INTRAVENOUS at 17:17

## 2025-04-25 RX ADMIN — PROPOFOL 150 MG: 10 INJECTION, EMULSION INTRAVENOUS at 15:04

## 2025-04-25 RX ADMIN — DEXMEDETOMIDINE HYDROCHLORIDE 2 MCG: 100 INJECTION, SOLUTION INTRAVENOUS at 17:52

## 2025-04-25 RX ADMIN — HALOPERIDOL 5 MG: 5 INJECTION INTRAMUSCULAR at 18:27

## 2025-04-25 RX ADMIN — INSULIN LISPRO 1 UNITS: 100 INJECTION, SOLUTION INTRAVENOUS; SUBCUTANEOUS at 22:56

## 2025-04-25 RX ADMIN — HYDROMORPHONE HYDROCHLORIDE 0.5 MG: 2 INJECTION, SOLUTION INTRAMUSCULAR; INTRAVENOUS; SUBCUTANEOUS at 17:55

## 2025-04-25 RX ADMIN — Medication 100 MCG: at 15:18

## 2025-04-25 RX ADMIN — Medication 200 MCG: at 16:29

## 2025-04-25 RX ADMIN — PROPOFOL 50 MG: 10 INJECTION, EMULSION INTRAVENOUS at 15:14

## 2025-04-25 ASSESSMENT — PAIN DESCRIPTION - ORIENTATION
ORIENTATION: LEFT

## 2025-04-25 ASSESSMENT — PAIN DESCRIPTION - DESCRIPTORS
DESCRIPTORS: SORE
DESCRIPTORS: ACHING
DESCRIPTORS: DISCOMFORT
DESCRIPTORS: ACHING

## 2025-04-25 ASSESSMENT — PAIN - FUNCTIONAL ASSESSMENT
PAIN_FUNCTIONAL_ASSESSMENT: 0-10
PAIN_FUNCTIONAL_ASSESSMENT: PREVENTS OR INTERFERES SOME ACTIVE ACTIVITIES AND ADLS

## 2025-04-25 ASSESSMENT — PAIN SCALES - GENERAL
PAINLEVEL_OUTOF10: 5
PAINLEVEL_OUTOF10: 8
PAINLEVEL_OUTOF10: 7
PAINLEVEL_OUTOF10: 0
PAINLEVEL_OUTOF10: 7
PAINLEVEL_OUTOF10: 0

## 2025-04-25 ASSESSMENT — PAIN DESCRIPTION - LOCATION
LOCATION: HIP
LOCATION: KNEE;LEG
LOCATION: HIP
LOCATION: LEG

## 2025-04-25 NOTE — ED NOTES
Report called to nurse receiving patient in room Steven Maria. All questions answered at this time

## 2025-04-25 NOTE — PLAN OF CARE
63yo F with displaced left distal femur fracture    -NPO for OR today for ORIF  -knee immobilizer  -consult note to follow  -appreciate medical optimization    Lan De La Rosa MD  Orthopedic Surgery, Adult Reconstruction

## 2025-04-25 NOTE — CONSULTS
[facility] Orthopedic Surgery  Consult Note    Patient: Edna Mcclain  Admit Date: 2025  Requesting Physician: Evin Horowitz MD  Room: [address] [place] [address]/[facility]-    Chief complaint: left femur fracture    HPI:Edna Mcclain is a 64 y.o. female with a PMHx of asthma who presented to the ER for evaluation following a fall at home.   Pt reporting accidental fall over a cord plugged into the wall.  She landed directly onto her left knee.  She felt immediate pain to the area and was unable to bear weight.  Denies any preceding symptoms.       In the ER, workup was consistent with severely comminuted and displaced distal left femur fracture.    Medical History:  Past Medical History:   Diagnosis Date    Other acne     Unspecified asthma(493.90)      Past Surgical History:   Procedure Laterality Date    ADENOIDECTOMY      BREAST FIBROADENOMA SURGERY Left      SECTION      TONSILLECTOMY         Social History:    reports that she has never smoked. She has never used smokeless tobacco.    Family History:        Problem Relation Age of Onset    Emphysema Mother     Hypertension Mother     Heart Disease Mother     Alzheimer's Disease Mother     Heart Disease Father     Heart Disease Maternal Grandmother     Cancer Paternal Grandmother     Asthma Daughter     Diabetes Other        The patient has  no family history significant for MSK and orthopedic illnesses    Medications:  ALL MEDICATIONS HAVE BEEN REVIEWED:  Scheduled:   insulin lispro  0-4 Units SubCUTAneous 4x Daily AC & HS     Continuous:   dextrose      sodium chloride 75 mL/hr at 25 1120     PRN:hydrALAZINE, dextrose bolus **OR** dextrose bolus, glucagon (rDNA), dextrose, sodium chloride flush, ondansetron, senna, acetaminophen **OR** acetaminophen, melatonin, HYDROmorphone, traMADol    Allergies:   Allergies   Allergen Reactions    Sulfa Antibiotics        Review of Systems:  Constitutional: Negative for fever, chills, fatigue.   Skin:  Negative for pruritis,

## 2025-04-25 NOTE — ED NOTES
Patient Name: Edna Mcclain  : 1960 64 y.o.  MRN: [id number]  ED Room #: ED-0021/21     Chief complaint:   Chief Complaint   Patient presents with    Fall     Patient fell from standing position when cord wrapped around ankle; pain in left knee; denies hitting head and blood thinners; arrive via [facility] Problem/Diagnosis:   Hospital Problems           Last Modified POA    * (Principal) Closed fracture of left femur, initial encounter ([facility]) 2025 Yes         O2 Flow Rate:O2 Device: None (Room air)   (if applicable)  Cardiac Rhythm:   (if applicable)  Active LDA's:   Peripheral IV 25 Proximal;Right;Dorsal Forearm (Active)            How does patient ambulate? Front Wheeled Walker    2. How does patient take pills? Whole with Water    3. Is patient alert? Alert    4. Is patient oriented? To Person, To Place, To Time, To Situation, and Follows Commands    5.   Patient arrived from:  home  Facility Name: ___________________________________________    6. If patient is disoriented or from a [facility] Nursing Facility has family been notified of admission? No    7. Patient belongings? Belongings: Cell Phone, Wallet, and Clothing    Disposition of belongings? Kept with Patient     8. Any specific patient or family belongings/needs/dynamics?   a.     9. Miscellaneous comments/pending orders?  a.      If there are any additional questions please reach out to the Emergency Department.

## 2025-04-25 NOTE — ANESTHESIA PRE PROCEDURE
Department of Anesthesiology  Preprocedure Note       Name:  Edna Mcclain   Age:  64 y.o.  :  1960                                          MRN:  [id number]         Date:  2025      Surgeon: Surgeon(s):  Lan De La Rosa MD    Procedure: Procedure(s):  CLOSED REDUCTION INTRAMEDULLARY NAILING OF LEFT FEMUR (KATHY)    Medications prior to admission:   Prior to Admission medications    Medication Sig Start Date End Date Taking? Authorizing Provider   predniSONE (DELTASONE) 20 MG tablet 1 TID for 3 day then 1 BID  Patient not taking: Reported on 2025   Mookie Payne MD       Current medications:    Current Facility-Administered Medications   Medication Dose Route Frequency Provider Last Rate Last Admin   • hydrALAZINE (APRESOLINE) injection 10 mg  10 mg IntraVENous Q6H PRN Evin Horowitz MD   10 mg at 25 0322   • dextrose bolus 10% 125 mL  125 mL IntraVENous PRN DechristopherPedroa OTILIA APRN - CNP        Or   • dextrose bolus 10% 250 mL  250 mL IntraVENous PRN DechristopherFernanda APRN - CNP       • glucagon injection 1 mg  1 mg SubCUTAneous PRN DechristopherPedroa OTILIA APRN - CNP       • dextrose 10 % infusion   IntraVENous Continuous PRN DechristophFernanda gonzalez APRN - CNP       • insulin lispro (HUMALOG,ADMELOG) injection vial 0-4 Units  0-4 Units SubCUTAneous 4x Daily AC & HS DechristopherPedroa OTILIA APRN - CNP       • 0.9 % sodium chloride infusion   IntraVENous Continuous DechristophFernanda gonzalez APRN - CNP 75 mL/hr at 25 1120 New Bag at 25 1120   • sodium chloride flush 0.9 % injection 10 mL  10 mL IntraVENous PRN Evin Horowitz MD       • ondansetron (ZOFRAN) injection 4 mg  4 mg IntraVENous Q6H PRN Evin Horowitz MD       • senna (SENOKOT) tablet 8.6 mg  1 tablet Oral Daily PRN Evin Horowitz MD       • acetaminophen (TYLENOL) tablet 650 mg  650 mg Oral Q6H PRN Evin Horowitz MD        Or   • acetaminophen (TYLENOL) suppository 650 mg  650 mg

## 2025-04-25 NOTE — H&P
HOSPITAL MEDICINE   HISTORY & PHYSICAL        Date of Admission:  25  MRN: [id number]  Date of Service: 25  Location:  [facility]       CC:    Chief Complaint   Patient presents with    Fall     Patient fell from standing position when cord wrapped around ankle; pain in left knee; denies hitting head and blood thinners; arrive via [facility]         HISTORY OF PRESENT ILLNESS:     Edna Mcclain is a 64 y.o. female with a PMHx of asthma who presented to the ER for evaluation following a fall at home.   Pt reporting accidental fall over a cord plugged into the wall.  She landed directly onto her left knee.  She felt immediate pain to the area and was unable to bear weight.  Denies any preceding symptoms.      In the ER, workup was consistent with severely comminuted and displaced distal left femur fracture.    Case discussed with ED NP for admission.  External medical records reviewed.    PAST MEDICAL HX:  Past Medical History:   Diagnosis Date    Other acne     Unspecified asthma(493.90)      SURGICAL HX:  Past Surgical History:   Procedure Laterality Date    ADENOIDECTOMY      BREAST FIBROADENOMA SURGERY Left      SECTION      TONSILLECTOMY       FAMILY HX:  Family History   Problem Relation Age of Onset    Emphysema Mother     Hypertension Mother     Heart Disease Mother     Alzheimer's Disease Mother     Heart Disease Father     Heart Disease Maternal Grandmother     Cancer Paternal Grandmother     Asthma Daughter     Diabetes Other      SOCIAL HX:  Social History     Tobacco Use    Smoking status: Never    Smokeless tobacco: Never   Substance Use Topics    Alcohol use: No    Drug use: No      ALLERGIES:  Allergies   Allergen Reactions    Sulfa Antibiotics      HOME MEDICATIONS:     Prior to Admission medications    Medication Sig Start  MD Lor  Hospitalist

## 2025-04-25 NOTE — PROGRESS NOTES
[facility]    4/25/2025 7:59 AM        Name: Edna Mcclain .              Admitted: 4/24/2025  Primary Care Provider: No primary care provider on file. (Tel: None)      Subjective:  .    Had mechanical fall,  tripped on a cord.    Seen this am with  at bedside  Currently pain free if still in the bed. Has # 10 pain with slight movement.    Currently reports some GERD sx.  IV pepcid given    No issues with previous C section/ tonsil removal     We discussed elevated BG levels     Reviewed interval ancillary notes    Current Medications  hydrALAZINE (APRESOLINE) injection 10 mg, Q6H PRN  sodium chloride flush 0.9 % injection 10 mL, PRN  ondansetron (ZOFRAN) injection 4 mg, Q6H PRN  senna (SENOKOT) tablet 8.6 mg, Daily PRN  acetaminophen (TYLENOL) tablet 650 mg, Q6H PRN   Or  acetaminophen (TYLENOL) suppository 650 mg, Q6H PRN  melatonin tablet 6 mg, Nightly PRN  HYDROmorphone HCl PF (DILAUDID) injection 0.5 mg, Q4H PRN  traMADol (ULTRAM) tablet 50 mg, Q6H PRN        Objective:  /79   Pulse 99   Temp 98.3 °F (36.8 °C) (Oral)   Resp 16   Ht 1.676 m (5' 6\")   Wt 113.4 kg (250 lb)   SpO2 97%   BMI 40.35 kg/m²   No intake or output data in the 24 hours ending 04/25/25 0759   Wt Readings from Last 3 Encounters:   04/24/25 113.4 kg (250 lb)   08/13/24 121.1 kg (267 lb)   08/08/23 124.4 kg (274 lb 4 oz)       General appearance:  Appears comfortable if still in the bed. Alert and pleasant   Eyes: Sclera clear. Pupils equal.  ENT: Moist oral mucosa. Trachea midline, no adenopathy.  Cardiovascular: Regular rhythm, normal S1, S2. No murmur. No edema in lower extremities  Respiratory: Not using accessory muscles. Good inspiratory effort. Clear to auscultation bilaterally, no wheeze or crackles.   GI: Abdomen soft, no tenderness, not distended, normal bowel sounds  Musculoskeletal: immobilizer in place to the left leg.  Feet are sensate and warm   Neurology: CN 2-12 grossly

## 2025-04-25 NOTE — CARE COORDINATION
Discharge Planning Note:    Chart reviewed and it appears that patient has minimal needs for discharge at this time. Risk Score 5 %     Primary Care Physician is not listed  Primary insurance is [employer]- commercial    Patient is from home.    Please notify case management if any discharge needs are identified.      Case management will continue to follow progress and update discharge plan as needed.      Electronically signed by CHERELLE Mendes on 4/25/2025 at 8:53 AM

## 2025-04-25 NOTE — PROGRESS NOTES
Pt arrived to PACU from OR, VSS less tachycardia (HR 120s), pt confused and agitated; CRNA at bedside. L proximal thigh dressing is CDI, ice applied. L knee dressing shows small amount of drainage. L pedal pulse is +2, cap refill is < 3 seconds, and pt verbalizes sensation. Moderate dorsiflexion and extension noted. Will continue to monitor.

## 2025-04-25 NOTE — PROGRESS NOTES
Pharmacy Home Medication Reconciliation Note    A medication reconciliation has been completed for Edna Mcclain 1960    Pharmacy: [facility] [address], [place], [place]  Information provided by: patient    The patient's home medication list is as follows:  No current facility-administered medications on file prior to encounter.     Current Outpatient Medications on File Prior to Encounter   Medication Sig Dispense Refill    predniSONE (DELTASONE) 20 MG tablet 1 TID for 3 day then 1 BID (Patient not taking: Reported on 4/24/2025) 15 tablet 0       Patient is no longer taking any medication.      Timing of last doses updated.    Thank you,  Nel Gentile CPhT

## 2025-04-25 NOTE — ED PROVIDER NOTES
EMERGENCY DEPARTMENT PROVIDER NOTE         PATIENT IDENTIFICATION     Name:   Edna Mcclain  MRN:   [id number]  YOB: 1960  Date of Evaluation:   4/24/2025  Provider:   Mayra Akhtar NP; Pedrito Pena DO  PCP:   No primary care provider on file.        CHIEF COMPLAINT       Fall (Patient fell from standing position when cord wrapped around ankle; pain in left knee; denies hitting head and blood thinners; arrive via [facility] )        HISTORY OF PRESENT ILLNESS     I independently interviewed patient and/or caretaker(s).  See Advanced Practice Provider (AMITA) note for full HPI.  In summary, Edna Mcclain  is a(n) 64 y.o. female who presents with left knee pain following a mechanical fall.        PHYSICAL EXAM     I reviewed physical exam performed and documented by AMITA.  I performed an independent physical examination with findings as follows:  Overall well-appearing female with reproducible tenderness to palpation of the proximal knee joint which is rather edematous.  Neurovascular intact distally.        LAB RESULTS     Results for orders placed or performed during the hospital encounter of 04/24/25   CBC with Auto Differential   Result Value Ref Range    WBC 14.4 (H) 4.0 - 11.0 K/uL    RBC 4.45 4.00 - 5.20 M/uL    Hemoglobin 14.0 12.0 - 16.0 g/dL    Hematocrit 40.3 36.0 - 48.0 %    MCV 90.5 80.0 - 100.0 fL    MCH 31.5 26.0 - 34.0 pg    MCHC 34.8 31.0 - 36.0 g/dL    RDW 13.5 12.4 - 15.4 %    Platelets 253 135 - 450 K/uL    MPV 7.8 5.0 - 10.5 fL    Neutrophils % 78.0 %    Lymphocytes % 16.0 %    Monocytes % 4.6 %    Eosinophils % 1.0 %    Basophils % 0.4 %    Neutrophils Absolute 11.2 (H) 1.7 - 7.7 K/uL    Lymphocytes Absolute 2.3 1.0 - 5.1 K/uL    Monocytes Absolute 0.7 0.0 - 1.3 K/uL    Eosinophils Absolute 0.1 0.0 - 0.6 K/uL    Basophils Absolute 0.1 0.0 - 0.2 K/uL   Basic Metabolic Panel   Result Value Ref Range    Sodium 138 136 - 145 mmol/L    Potassium 3.7 3.5 - 5.1 mmol/L    Chloride

## 2025-04-25 NOTE — ANESTHESIA POSTPROCEDURE EVALUATION
Department of Anesthesiology  Postprocedure Note    Patient: Edna Mcclain  MRN: [id number]  YOB: 1960  Date of evaluation: 4/25/2025    Procedure Summary       Date: 04/25/25 Room / Location: [address]    Anesthesia Start: 1501 Anesthesia Stop: 1757    Procedure: INTRAMEDULLARY NAILING OF LEFT FEMUR (KATHY) (Left: Leg Upper) Diagnosis:       Closed fracture of distal end of left femur, unspecified fracture morphology, initial encounter ([facility])      (Closed fracture of distal end of left femur, unspecified fracture morphology, initial encounter ([facility]) [S72.402A])    Surgeons: Lan De La Rosa MD Responsible Provider: Raul Solomon MD    Anesthesia Type: general ASA Status: 2            Anesthesia Type: No value filed.    Jaret Phase I: Jaret Score: 8    Jaret Phase II:      Anesthesia Post Evaluation    Patient location during evaluation: PACU  Patient participation: complete - patient participated  Level of consciousness: awake and alert  Airway patency: patent  Nausea & Vomiting: no nausea and no vomiting  Cardiovascular status: hemodynamically stable  Respiratory status: acceptable  Hydration status: euvolemic  Multimodal analgesia pain management approach  Pain management: satisfactory to patient    No notable events documented.

## 2025-04-25 NOTE — ED PROVIDER NOTES
[facility]  EMERGENCY DEPARTMENT ENCOUNTER        Pt Name: Edna Mcclain  MRN: [id number]  Birthdate 1960  Date of evaluation: 4/24/2025  Provider: CECILE Boateng CNP  PCP: No primary care provider on file.  Note Started: 9:49 PM EDT 4/24/25       I have seen and evaluated this patient with my supervising physician alec.      CHIEF COMPLAINT       Chief Complaint   Patient presents with    Fall     Patient fell from standing position when cord wrapped around ankle; pain in left knee; denies hitting head and blood thinners; arrive via [facility]        HISTORY OF PRESENT ILLNESS: 1 or more Elements     History From: patient  Limitations to history : None    Edna Mcclain is a 64 y.o. female who presents to the emergency department with complaint of left knee injury.  The patient reports that she tripped over her 's iPad that was plugged into the wall when the cord wrapped around her ankle, causing her to fall.  She did land directly on the left knee.  She was unable to get up.  Patient has pain and swelling over the anterior left knee.  She is unable to straighten her leg.  Reports sharp pain 8/10.    Denies any headache, fever, lightheadedness, dizziness, visual disturbances.  No chest pain or pressure.  No neck or back pain.  No shortness of breath, cough, or congestion.  No abdominal pain, nausea, vomiting, diarrhea, constipation, or dysuria.  No rash.    Nursing Notes were all reviewed and agreed with or any disagreements were addressed in the HPI.    REVIEW OF SYSTEMS :      Review of Systems   Constitutional:  Negative for activity change, chills and fever.   Respiratory:  Negative for chest tightness and shortness of breath.    Cardiovascular:  Negative for chest pain.   Gastrointestinal:  Negative for abdominal pain, diarrhea, nausea and vomiting.   Genitourinary:  Negative for dysuria.   Musculoskeletal:         Left knee pain and swelling   All other  paged for admission.      Social Determinants Significantly Affecting Health : None    Records Reviewed (External, Source and Summary) skin lesion Vivi Payne office visit 8/29/2024    CC/HPI Summary, DDx, ED Course, and Reassessment:     Briefly, this is a 64 year old female who presents to the emergency department with complaint of left knee injury.  The patient reports that she tripped over her 's iPad that was plugged into the wall when the cord wrapped around her ankle, causing her to fall.  She did land directly on the left knee.  She was unable to get up.  Patient has pain and swelling over the anterior left knee.  She is unable to straighten her leg.  Reports sharp pain 8/10.    Preop labs were completed.    XR FEMUR LEFT (MIN 2 VIEWS) (Final result)  Result time 04/24/25 23:21:01  Procedure changed from XR FEMUR RIGHT (MIN 2 VIEWS)  Final result by June Sanchez MD (04/24/25 23:21:01)                Impression:    Comminuted distal femoral fracture.              XR KNEE LEFT (1-2 VIEWS) (Final result)  Result time 04/25/25 00:00:28  Procedure changed from XR KNEE LEFT (3 VIEWS)  Final result by Steven Smart MD (04/25/25 00:00:28)                Impression:    Severely comminuted, intra-articular fracture of the distal femoral  metaphysis involving both the medial and lateral femoral condyles.              CT KNEE LEFT WO CONTRAST (Final result)  Result time 04/24/25 23:50:30  Final result by Bigg Murdock MD (04/24/25 23:50:30)                Impression:    1.  Acute comminuted and displaced fracture involving the distal femoral  metaphysis.  Acute comminuted longitudinal fracture lines distally into the  intercondylar notch.  There are multiple fragments along the medial and  lateral aspects of the femoral shaft and supracondylar fracture site.    2.  No acute fracture of the patella, tibial plateau, or proximal fibula.  Tibiofemoral joint is not dislocated.    3.  Surprisingly small

## 2025-04-26 PROBLEM — S72.402A CLOSED FRACTURE OF LEFT DISTAL FEMUR (HCC): Status: ACTIVE | Noted: 2025-04-24

## 2025-04-26 LAB
ANION GAP SERPL CALCULATED.3IONS-SCNC: 9 MMOL/L (ref 3–16)
BASOPHILS # BLD: 0 K/UL (ref 0–0.2)
BASOPHILS NFR BLD: 0.1 %
BUN SERPL-MCNC: 14 MG/DL (ref 7–20)
CALCIUM SERPL-MCNC: 8.4 MG/DL (ref 8.3–10.6)
CHLORIDE SERPL-SCNC: 106 MMOL/L (ref 99–110)
CO2 SERPL-SCNC: 23 MMOL/L (ref 21–32)
CREAT SERPL-MCNC: 0.7 MG/DL (ref 0.6–1.2)
DEPRECATED RDW RBC AUTO: 13.8 % (ref 12.4–15.4)
EKG ATRIAL RATE: 83 BPM
EKG ATRIAL RATE: 94 BPM
EKG DIAGNOSIS: NORMAL
EKG DIAGNOSIS: NORMAL
EKG P AXIS: 49 DEGREES
EKG P AXIS: 65 DEGREES
EKG P-R INTERVAL: 168 MS
EKG P-R INTERVAL: 170 MS
EKG Q-T INTERVAL: 424 MS
EKG Q-T INTERVAL: 466 MS
EKG QRS DURATION: 150 MS
EKG QRS DURATION: 152 MS
EKG QTC CALCULATION (BAZETT): 530 MS
EKG QTC CALCULATION (BAZETT): 547 MS
EKG R AXIS: -51 DEGREES
EKG R AXIS: -52 DEGREES
EKG T AXIS: 101 DEGREES
EKG T AXIS: 84 DEGREES
EKG VENTRICULAR RATE: 83 BPM
EKG VENTRICULAR RATE: 94 BPM
EOSINOPHIL # BLD: 0 K/UL (ref 0–0.6)
EOSINOPHIL NFR BLD: 0 %
GFR SERPLBLD CREATININE-BSD FMLA CKD-EPI: >90 ML/MIN/{1.73_M2}
GLUCOSE BLD-MCNC: 134 MG/DL (ref 70–99)
GLUCOSE BLD-MCNC: 139 MG/DL (ref 70–99)
GLUCOSE BLD-MCNC: 141 MG/DL (ref 70–99)
GLUCOSE SERPL-MCNC: 173 MG/DL (ref 70–99)
HCT VFR BLD AUTO: 34.9 % (ref 36–48)
HGB BLD-MCNC: 11.9 G/DL (ref 12–16)
LYMPHOCYTES # BLD: 1.2 K/UL (ref 1–5.1)
LYMPHOCYTES NFR BLD: 7.1 %
MCH RBC QN AUTO: 31.3 PG (ref 26–34)
MCHC RBC AUTO-ENTMCNC: 34.2 G/DL (ref 31–36)
MCV RBC AUTO: 91.6 FL (ref 80–100)
MONOCYTES # BLD: 0.9 K/UL (ref 0–1.3)
MONOCYTES NFR BLD: 5.5 %
NEUTROPHILS # BLD: 14.4 K/UL (ref 1.7–7.7)
NEUTROPHILS NFR BLD: 87.3 %
PERFORMED ON: ABNORMAL
PLATELET # BLD AUTO: 233 K/UL (ref 135–450)
PMV BLD AUTO: 7.5 FL (ref 5–10.5)
POTASSIUM SERPL-SCNC: 4.2 MMOL/L (ref 3.5–5.1)
RBC # BLD AUTO: 3.81 M/UL (ref 4–5.2)
SODIUM SERPL-SCNC: 138 MMOL/L (ref 136–145)
WBC # BLD AUTO: 16.5 K/UL (ref 4–11)

## 2025-04-26 PROCEDURE — 80048 BASIC METABOLIC PNL TOTAL CA: CPT

## 2025-04-26 PROCEDURE — 0QSC06Z REPOSITION LEFT LOWER FEMUR WITH INTRAMEDULLARY INTERNAL FIXATION DEVICE, OPEN APPROACH: ICD-10-PCS | Performed by: ORTHOPAEDIC SURGERY

## 2025-04-26 PROCEDURE — 6360000002 HC RX W HCPCS: Performed by: NURSE PRACTITIONER

## 2025-04-26 PROCEDURE — 97166 OT EVAL MOD COMPLEX 45 MIN: CPT

## 2025-04-26 PROCEDURE — 99024 POSTOP FOLLOW-UP VISIT: CPT | Performed by: ORTHOPAEDIC SURGERY

## 2025-04-26 PROCEDURE — 85025 COMPLETE CBC W/AUTO DIFF WBC: CPT

## 2025-04-26 PROCEDURE — 83036 HEMOGLOBIN GLYCOSYLATED A1C: CPT

## 2025-04-26 PROCEDURE — 2500000003 HC RX 250 WO HCPCS: Performed by: ORTHOPAEDIC SURGERY

## 2025-04-26 PROCEDURE — 2500000003 HC RX 250 WO HCPCS: Performed by: INTERNAL MEDICINE

## 2025-04-26 PROCEDURE — 93010 ELECTROCARDIOGRAM REPORT: CPT | Performed by: INTERNAL MEDICINE

## 2025-04-26 PROCEDURE — 6360000002 HC RX W HCPCS: Performed by: ORTHOPAEDIC SURGERY

## 2025-04-26 PROCEDURE — 97162 PT EVAL MOD COMPLEX 30 MIN: CPT

## 2025-04-26 PROCEDURE — 97530 THERAPEUTIC ACTIVITIES: CPT

## 2025-04-26 PROCEDURE — 1200000000 HC SEMI PRIVATE

## 2025-04-26 PROCEDURE — 6370000000 HC RX 637 (ALT 250 FOR IP): Performed by: INTERNAL MEDICINE

## 2025-04-26 RX ORDER — ENOXAPARIN SODIUM 100 MG/ML
40 INJECTION SUBCUTANEOUS 2 TIMES DAILY
Status: DISCONTINUED | OUTPATIENT
Start: 2025-04-26 | End: 2025-05-03 | Stop reason: HOSPADM

## 2025-04-26 RX ADMIN — TRAMADOL HYDROCHLORIDE 50 MG: 50 TABLET, COATED ORAL at 11:25

## 2025-04-26 RX ADMIN — ENOXAPARIN SODIUM 40 MG: 100 INJECTION SUBCUTANEOUS at 20:02

## 2025-04-26 RX ADMIN — WATER 2000 MG: 1 INJECTION INTRAMUSCULAR; INTRAVENOUS; SUBCUTANEOUS at 00:43

## 2025-04-26 RX ADMIN — SODIUM CHLORIDE, PRESERVATIVE FREE 10 ML: 5 INJECTION INTRAVENOUS at 08:54

## 2025-04-26 RX ADMIN — HYDROMORPHONE HYDROCHLORIDE 0.5 MG: 1 INJECTION, SOLUTION INTRAMUSCULAR; INTRAVENOUS; SUBCUTANEOUS at 07:04

## 2025-04-26 RX ADMIN — TRAMADOL HYDROCHLORIDE 50 MG: 50 TABLET, COATED ORAL at 17:42

## 2025-04-26 RX ADMIN — ENOXAPARIN SODIUM 40 MG: 100 INJECTION SUBCUTANEOUS at 11:14

## 2025-04-26 RX ADMIN — WATER 2000 MG: 1 INJECTION INTRAMUSCULAR; INTRAVENOUS; SUBCUTANEOUS at 08:53

## 2025-04-26 ASSESSMENT — PAIN DESCRIPTION - PAIN TYPE
TYPE: SURGICAL PAIN
TYPE: SURGICAL PAIN
TYPE: SURGICAL PAIN;ACUTE PAIN

## 2025-04-26 ASSESSMENT — PAIN DESCRIPTION - ORIENTATION
ORIENTATION: LEFT

## 2025-04-26 ASSESSMENT — PAIN DESCRIPTION - DESCRIPTORS
DESCRIPTORS: ACHING

## 2025-04-26 ASSESSMENT — PAIN DESCRIPTION - LOCATION
LOCATION: HIP;LEG
LOCATION: LEG
LOCATION: LEG;HIP

## 2025-04-26 ASSESSMENT — PAIN SCALES - GENERAL
PAINLEVEL_OUTOF10: 4
PAINLEVEL_OUTOF10: 2
PAINLEVEL_OUTOF10: 5

## 2025-04-26 ASSESSMENT — PAIN - FUNCTIONAL ASSESSMENT
PAIN_FUNCTIONAL_ASSESSMENT: PREVENTS OR INTERFERES SOME ACTIVE ACTIVITIES AND ADLS

## 2025-04-26 NOTE — PROGRESS NOTES
[facility] - Inpatient Rehabilitation Department   Phone: [phone]    Occupational Therapy    [x] Initial Evaluation            [] Daily Treatment Note         [] Discharge Summary      Patient: Edna Mcclain   : 1960   MRN: [id number]   Date of Service:  2025    Admitting Diagnosis:  Closed fracture of left femur, initial encounter ([facility])  Current Admission Summary: 64 y.o. female with a PMHx of asthma who presented to the ER for evaluation following a fall at home.   Pt reporting accidental fall over a cord plugged into the wall.  She landed directly onto her left knee.  She felt immediate pain to the area and was unable to bear weight.  Denies any preceding symptoms.       In the ER, workup was consistent with severely comminuted and displaced distal left femur fracture.  Past Medical History:  has a past medical history of Other acne and Unspecified asthma(493.90).  Past Surgical History:  has a past surgical history that includes Tonsillectomy;  section; Adenoidectomy; and Breast fibroadenoma surgery (Left).    Discharge Recommendations: Edna Mcclain scored a  on the AM-PAC ADL Inpatient form. Current research shows that an AM-PAC score of 17 or less is typically not associated with a discharge to the patient's home setting. Based on the patient's AM-PAC score and their current ADL deficits, it is recommended that the patient have 5-7 sessions per week of Occupational Therapy at d/c to increase the patient's independence.  At this time, this patient demonstrates complex nursing, medical, and rehabilitative needs, and would benefit from intensive rehabilitation services upon discharge from the Inpatient setting.  This patient demonstrates the ability to participate in and benefit from an intensive therapy program with a coordinated interdisciplinary team approach to foster frequent, structured, and documented communication among disciplines, who will work together to  moderate assistance   Patient will increase functional standing balance to 1-2 minutes modA for improved ADL completion  Patient will complete bed mobility at minimal assistance     Above goals reviewed on 4/26/2025.  All goals are ongoing at this time unless indicated above.       Therapy Session Time     Individual Group Co-treatment   Time In    1304   Time Out    1427   Minutes    83        Timed Code Treatment Minutes:   68  Total Treatment Minutes:  83       Electronically Signed By: Genie Cross OTR/L [id number]

## 2025-04-26 NOTE — OP NOTE
Patient: Edna Mcclain                    : 1960     MRN: [id number]     Date: 25    SURGEON: Lan De La Rosa MD     ASSISTANT: SA nuñez    PREOPERATIVE DIAGNOSES:  Left intra-articular distal femur fracture    1)     POSTOPERATIVE DIAGNOSES: Same     PROCEDURES: left femur retrograde nailing    ANESTHESIA: general    COMPLICATIONS: none    ESTIMATED BLOOD LOSS: ~200cc    SPECIMENS: none    DRAINS: none    TOURNIQUET TIME:  60m    IMPLANTS USED:   Implant Name Type Inv. Item Serial No.  Lot No. LRB No. Used Action   KWIRE [id number] - [id number]  KWIRE [id number]  [employer] [facility] [id number] Left 1 Implanted and Explanted   KWIRE [id number] - [id number]  KWIRE [id number]  [employer] [facility]- [id number] Left 1 Implanted and Explanted   K WIRE FIX L310MM DIA1.8MM FOR CONDYLE SCR S2 FEM NAILING - [id number]  K WIRE FIX L310MM DIA1.8MM FOR CONDYLE SCR S2 FEM NAILING  [employer] [facility] [id number] Left 1 Implanted and Explanted   K WIRE FIX L310MM DIA1.8MM FOR CONDYLE SCR S2 FEM NAILING - [id number]  K WIRE FIX L310MM DIA1.8MM FOR CONDYLE SCR S2 FEM NAILING  [employer] [facility] [id number] Left 1 Implanted and Explanted   NAIL IM L 340 MM DIA13 MM TI FEM RETROGRADE STRL T2 ALPHA - [id number]  NAIL IM L 340 MM DIA13 MM TI FEM RETROGRADE STRL T2 ALPHA  [employer] [facility] [id number] Left 1 Implanted   SCREW BNE ADV LCK [id number] - [id number]  SCREW BNE ADV LCK [id number]  [employer] [facility] [id number] Left 1 Implanted   SCREW BNE ADV LCK [id number] - [id number]  SCREW BNE ADV LCK [id number]  [employer] [facility] [id number] Left 1 Implanted and Explanted   SCREW BNE LCK 5X65 MM ADV STRL ALPHA - [id number]  SCREW BNE LCK 5X65 MM ADV STRL ALPHA  [employer] [facility]- [id number] Left 1 Implanted   SCREW BNE ADV LCK [id number] - [id number]  SCREW BNE ADV LCK [id number]  [employer] [id number] Left 1 Implanted   LOCKING SCREW - [id number]  LOCKING SCREW   Operating Room staff. It was also confirmed that the patient had received a dose of intravenous prophylactic antibiotics.     PROCEDURE: Incision was made over the anterior knee, a medial parapatellar arthrotomy was created, the patella was everted at this point it was clear there was a an intercondylar split.  Using x-ray as well as direct visualization I clamped and then pinned this area to reduce it and then focused on nailing.  After adequate reduction I created an entry hole in the distal femur and placed a guidewire down the extent of the femur.  I then measured length of the nail and proceeded with a 340 mm nail.  I reamed up until adequate chatter and then I passed the nail across the fracture site into the proximal femur.  At this point I will placed 4 distal locking screws and then placed a compression screw across the intercondylar area.  At this point I removed my reduction clamps and my pins and then focused on proximal interlocks.  Using the proximal interlock guide I placed 2 interlocking screws proximally.  Next I performed final x-rays confirming adequate reduction of the fracture as well as adequate positioning of the screws at this time I performed a layered closure of the knee I closed the fascia and the arthrotomy with a #1 Vicryl in a running STRATAFIX suture I closed the subcutaneous layer with an 0 Vicryl 2-0 Vicryl and a running Monocryl suture for the interlocking holes I placed 2-0 Vicryl and staples.  A compressive dressing was applied and the patient was transferred to the postoperative bed in stable condition.    ATTESTATION: Dr. De La Rosa was present and scrubbed for the entire procedure.

## 2025-04-26 NOTE — FLOWSHEET NOTE
Pt now appears to be resting more comfortably, but still displays intermittent periods of confusion, posing a safety risk to herself. Will continue with transfer to the ICU for closer monitoring.

## 2025-04-26 NOTE — PROGRESS NOTES
[facility] Orthopedic Surgery   Progress Note      SUBJECTIVE: She notes that her leg is feeling better.      OBJECTIVE:  BP (!) 146/73   Pulse 75   Temp 97.2 °F (36.2 °C) (Temporal)   Resp 14   Ht 1.676 m (5' 6\")   Wt 124.4 kg (274 lb 4 oz)   SpO2 100%   BMI 44.27 kg/m²   Patient is awake, alert, and in no acute distress.   Knee immobilizer intact.  Sensation is intact to light touch throughout the Left leg.  Dorsiflexion / plantarflexion intact.   The Left leg is warm and well perfused.    CBC:   Lab Results   Component Value Date/Time    WBC 16.5 04/26/2025 04:04 AM    RBC 3.81 04/26/2025 04:04 AM    HGB 11.9 04/26/2025 04:04 AM    HCT 34.9 04/26/2025 04:04 AM    MCV 91.6 04/26/2025 04:04 AM    MCH 31.3 04/26/2025 04:04 AM    MCHC 34.2 04/26/2025 04:04 AM    RDW 13.8 04/26/2025 04:04 AM     04/26/2025 04:04 AM    MPV 7.5 04/26/2025 04:04 AM       PT/INR:    Lab Results   Component Value Date/Time    PROTIME 13.9 04/25/2025 05:38 AM    INR 1.05 04/25/2025 05:38 AM       Chem Basic:   Lab Results   Component Value Date/Time     04/26/2025 04:04 AM    K 4.2 04/26/2025 04:04 AM     04/26/2025 04:04 AM    CO2 23 04/26/2025 04:04 AM    GLUCOSE 173 04/26/2025 04:04 AM    BUN 14 04/26/2025 04:04 AM    CREATININE 0.7 04/26/2025 04:04 AM           ASSESSMENT:  POD#1 s/p left femur retrograde IMN      PLAN:    --Pain control  -- DVT prophylaxis  -- Nonweightbearing left lower extremity  -- Continue knee immobilizer.  Can open for skin checks and hygiene.  -- PT/OT consult  -- Dispo planning        Electronically signed by Leopoldo Agustin MD on 4/26/2025 at 9:12 AM

## 2025-04-26 NOTE — PLAN OF CARE
63yo F POD 1 s.p left femur nailing    -NWB, brace when OOB  -ptot  -care pre primary team  -dvt ppx x 6 weeks-ASA    -dc pending medical stability, PTOT,     Lan De La Rosa MD  Orthopedic Surgery, Adult Reconstruction

## 2025-04-26 NOTE — FLOWSHEET NOTE
Pt has been combative and delirious. She has been given 0.5 mg of dilaudid, 10 mg of haldol, 1 mg of versed, and 1 mg of ativan. She remains A&O x1 with intermittent episodes of combativeness. She will be transferred to ICU for closer monitoring.

## 2025-04-26 NOTE — PLAN OF CARE
Problem: Discharge Planning  Goal: Discharge to home or other facility with appropriate resources  Outcome: Progressing     Problem: Pain  Goal: Verbalizes/displays adequate comfort level or baseline comfort level  Outcome: Progressing  Flowsheets  Taken 4/26/2025 0904 by Mariana Barton RN  Verbalizes/displays adequate comfort level or baseline comfort level: Encourage patient to monitor pain and request assistance  Taken 4/26/2025 0400 by Ron Hand  Verbalizes/displays adequate comfort level or baseline comfort level:   Encourage patient to monitor pain and request assistance   Assess pain using appropriate pain scale   Administer analgesics based on type and severity of pain and evaluate response   Implement non-pharmacological measures as appropriate and evaluate response   Consider cultural and social influences on pain and pain management   Notify Licensed Independent Practitioner if interventions unsuccessful or patient reports new pain  Taken 4/26/2025 0000 by Ron Hand  Verbalizes/displays adequate comfort level or baseline comfort level:   Encourage patient to monitor pain and request assistance   Assess pain using appropriate pain scale   Implement non-pharmacological measures as appropriate and evaluate response   Administer analgesics based on type and severity of pain and evaluate response   Consider cultural and social influences on pain and pain management   Notify Licensed Independent Practitioner if interventions unsuccessful or patient reports new pain     Problem: Safety - Adult  Goal: Free from fall injury  Outcome: Progressing

## 2025-04-26 NOTE — PROGRESS NOTES
[facility] - Inpatient Rehabilitation Department   Phone: [phone]    Physical Therapy    [x] Initial Evaluation            [] Daily Treatment Note         [] Discharge Summary      Patient: Edna Mcclain   : 1960   MRN: [id number]   Date of Service:  2025  Admitting Diagnosis: Closed fracture of left femur, initial encounter (HCC)  Current Admission Summary: Edna Mcclain is a 64 y.o. female with a PMHx of asthma who presented to the ER for evaluation following a fall at home.   Pt reporting accidental fall over a cord plugged into the wall.  She landed directly onto her left knee.  She felt immediate pain to the area and was unable to bear weight.  In the ER, workup was consistent with severely comminuted and displaced distal left femur fracture. S/p (L) femur retrograde IMN on .   Past Medical History:  has a past medical history of Other acne and Unspecified asthma(493.90).  Past Surgical History:  has a past surgical history that includes Tonsillectomy;  section; Adenoidectomy; and Breast fibroadenoma surgery (Left).  Discharge Recommendations: Edna Mcclain scored a 8/24 on the AM-PAC short mobility form. Current research shows that an AM-PAC score of 17 or less is typically not associated with a discharge to the patient's home setting. Based on the patient's AM-PAC score and their current functional mobility deficits, it is recommended that the patient have 5-7 sessions per week of Physical Therapy at d/c to increase the patient's independence.  At this time, this patient demonstrates complex nursing, medical, and rehabilitative needs, and would benefit from intensive rehabilitation services upon discharge from the Inpatient setting.  This patient demonstrates the ability to participate in and benefit from an intensive therapy program with a coordinated interdisciplinary team approach to foster frequent, structured, and documented communication among disciplines, who will  work together to establish, prioritize, and achieve treatment goals. Please see assessment section for further patient specific details. If patient discharges prior to next session this note will serve as a discharge summary.  Please see below for the latest assessment towards goals.    DME Required For Discharge: DME to be determined at next level of care, DME to be determined pending patient progress  If patient chooses to return home, she would require a RW, manual w/c, drop arm commode, and a slideboard.   Precautions/Restrictions: high fall risk, weight bearing, ROM restrictions  Weight Bearing Restrictions: non weight bearing   [x] Left Lower Extremity     Required Braces/Orthotics: knee immobilizer    [x] Left  Positional Restrictions: No (L) knee ROM    Pre-Admission Information   Lives With: spouse                  Type of Home: house  Home Layout: two level, bedroom/bathroom upstairs, 12 stairs to 2nd level with B HR  Home Access:  1+1 step to enter without rails   Bathroom Layout: walk in shower  Bathroom Equipment:  no modifications  Toilet Height: standard height  Home Equipment: no prior equipment  Transfer Assistance: Independent without use of device  Ambulation Assistance:Independent without use of device  ADL Assistance: independent with all ADL's  IADL Assistance: independent with homemaking tasks  Active :        [x] Yes                 [] No  Hand Dominance: [] Left                 [x] Right  Current Employment: retired.  Occupation: Special Education  Hobbies: Writer, Bike riding  Recent Falls: 1 fall in the last 6 months (this admission)     Available Assistance at Discharge: 24 hr physical assistance available    Examination   Vision:   Vision Gross Assessment: Impaired and Vision Corrective Device: wears glasses for reading  Hearing:   WFL  Observation:   General Observation:  Patient in semi-groves's position. On RA, telemetry, garcia catheter, (L) knee immobilizer in place  Posture:

## 2025-04-26 NOTE — PROGRESS NOTES
[facility]    4/26/2025 7:11 AM        Name: Edna Mcclain .              Admitted: 4/24/2025  Primary Care Provider: No primary care provider on file. (Tel: None)      Subjective:  .    Had mechanical fall,  tripped on a cord.    Had Left femur nailing on 4/25/25  Now NWB with brace when out of bed.  Will need 6 weeks of ASA at d/c for DVT prophylaxis       Had agitation post operatively and was sent to ICU  Seen this am.  Pain control is adequate when still in bed    We discussed therapy etc.        Reviewed interval ancillary notes    Current Medications  hydrALAZINE (APRESOLINE) injection 10 mg, Q6H PRN  dextrose bolus 10% 125 mL, PRN   Or  dextrose bolus 10% 250 mL, PRN  glucagon injection 1 mg, PRN  dextrose 10 % infusion, Continuous PRN  insulin lispro (HUMALOG,ADMELOG) injection vial 0-4 Units, 4x Daily AC & HS  0.9 % sodium chloride infusion, Continuous  ceFAZolin (ANCEF) 2,000 mg in sterile water 20 mL IV syringe, Q8H  dexmedeTOMIDine (PRECEDEX) 400 mcg in sodium chloride 0.9 % 100 mL infusion, Continuous  sodium chloride flush 0.9 % injection 10 mL, PRN  ondansetron (ZOFRAN) injection 4 mg, Q6H PRN  senna (SENOKOT) tablet 8.6 mg, Daily PRN  acetaminophen (TYLENOL) tablet 650 mg, Q6H PRN   Or  acetaminophen (TYLENOL) suppository 650 mg, Q6H PRN  melatonin tablet 6 mg, Nightly PRN  HYDROmorphone HCl PF (DILAUDID) injection 0.5 mg, Q4H PRN  traMADol (ULTRAM) tablet 50 mg, Q6H PRN        Objective:  BP (!) 146/73   Pulse 75   Temp 97.4 °F (36.3 °C) (Temporal)   Resp 15   Ht 1.676 m (5' 6\")   Wt 124.4 kg (274 lb 4 oz)   SpO2 100%   BMI 44.27 kg/m²     Intake/Output Summary (Last 24 hours) at 4/26/2025 0711  Last data filed at 4/26/2025 0600  Gross per 24 hour   Intake 1500 ml   Output 2325 ml   Net -825 ml      Wt Readings from Last 3 Encounters:   04/26/25 124.4 kg (274 lb 4 oz)   08/13/24 121.1 kg (267 lb)   08/08/23 124.4 kg (274 lb 4 oz)       General

## 2025-04-27 LAB
ANION GAP SERPL CALCULATED.3IONS-SCNC: 9 MMOL/L (ref 3–16)
BASOPHILS # BLD: 0 K/UL (ref 0–0.2)
BASOPHILS NFR BLD: 0.2 %
BUN SERPL-MCNC: 10 MG/DL (ref 7–20)
CALCIUM SERPL-MCNC: 8.2 MG/DL (ref 8.3–10.6)
CHLORIDE SERPL-SCNC: 102 MMOL/L (ref 99–110)
CO2 SERPL-SCNC: 26 MMOL/L (ref 21–32)
CREAT SERPL-MCNC: 0.6 MG/DL (ref 0.6–1.2)
DEPRECATED RDW RBC AUTO: 13.5 % (ref 12.4–15.4)
EOSINOPHIL # BLD: 0 K/UL (ref 0–0.6)
EOSINOPHIL NFR BLD: 0.1 %
EST. AVERAGE GLUCOSE BLD GHB EST-MCNC: 116.9 MG/DL
GFR SERPLBLD CREATININE-BSD FMLA CKD-EPI: >90 ML/MIN/{1.73_M2}
GLUCOSE BLD-MCNC: 128 MG/DL (ref 70–99)
GLUCOSE BLD-MCNC: 133 MG/DL (ref 70–99)
GLUCOSE BLD-MCNC: 143 MG/DL (ref 70–99)
GLUCOSE BLD-MCNC: 155 MG/DL (ref 70–99)
GLUCOSE SERPL-MCNC: 152 MG/DL (ref 70–99)
HBA1C MFR BLD: 5.7 %
HCT VFR BLD AUTO: 31.4 % (ref 36–48)
HGB BLD-MCNC: 10.9 G/DL (ref 12–16)
LYMPHOCYTES # BLD: 2.1 K/UL (ref 1–5.1)
LYMPHOCYTES NFR BLD: 14.7 %
MCH RBC QN AUTO: 31.4 PG (ref 26–34)
MCHC RBC AUTO-ENTMCNC: 34.6 G/DL (ref 31–36)
MCV RBC AUTO: 90.6 FL (ref 80–100)
MONOCYTES # BLD: 1.2 K/UL (ref 0–1.3)
MONOCYTES NFR BLD: 8.4 %
NEUTROPHILS # BLD: 11.1 K/UL (ref 1.7–7.7)
NEUTROPHILS NFR BLD: 76.6 %
PERFORMED ON: ABNORMAL
PLATELET # BLD AUTO: 222 K/UL (ref 135–450)
PMV BLD AUTO: 7.4 FL (ref 5–10.5)
POTASSIUM SERPL-SCNC: 3.8 MMOL/L (ref 3.5–5.1)
RBC # BLD AUTO: 3.47 M/UL (ref 4–5.2)
SODIUM SERPL-SCNC: 137 MMOL/L (ref 136–145)
WBC # BLD AUTO: 14.5 K/UL (ref 4–11)

## 2025-04-27 PROCEDURE — 97535 SELF CARE MNGMENT TRAINING: CPT

## 2025-04-27 PROCEDURE — 6370000000 HC RX 637 (ALT 250 FOR IP): Performed by: NURSE PRACTITIONER

## 2025-04-27 PROCEDURE — 36415 COLL VENOUS BLD VENIPUNCTURE: CPT

## 2025-04-27 PROCEDURE — 1200000000 HC SEMI PRIVATE

## 2025-04-27 PROCEDURE — 85025 COMPLETE CBC W/AUTO DIFF WBC: CPT

## 2025-04-27 PROCEDURE — 97530 THERAPEUTIC ACTIVITIES: CPT

## 2025-04-27 PROCEDURE — 6360000002 HC RX W HCPCS: Performed by: ORTHOPAEDIC SURGERY

## 2025-04-27 PROCEDURE — 80048 BASIC METABOLIC PNL TOTAL CA: CPT

## 2025-04-27 PROCEDURE — 6360000002 HC RX W HCPCS: Performed by: INTERNAL MEDICINE

## 2025-04-27 RX ORDER — OXYCODONE HYDROCHLORIDE 5 MG/1
5 TABLET ORAL EVERY 4 HOURS PRN
Refills: 0 | Status: DISCONTINUED | OUTPATIENT
Start: 2025-04-27 | End: 2025-05-03 | Stop reason: HOSPADM

## 2025-04-27 RX ORDER — CALCIUM CARBONATE 500 MG/1
500 TABLET, CHEWABLE ORAL 3 TIMES DAILY PRN
Status: DISCONTINUED | OUTPATIENT
Start: 2025-04-27 | End: 2025-05-03 | Stop reason: HOSPADM

## 2025-04-27 RX ADMIN — ONDANSETRON 4 MG: 2 INJECTION, SOLUTION INTRAMUSCULAR; INTRAVENOUS at 00:04

## 2025-04-27 RX ADMIN — ENOXAPARIN SODIUM 40 MG: 100 INJECTION SUBCUTANEOUS at 20:06

## 2025-04-27 RX ADMIN — ANTACID TABLETS 500 MG: 500 TABLET, CHEWABLE ORAL at 20:09

## 2025-04-27 RX ADMIN — ANTACID TABLETS 500 MG: 500 TABLET, CHEWABLE ORAL at 12:00

## 2025-04-27 RX ADMIN — ENOXAPARIN SODIUM 40 MG: 100 INJECTION SUBCUTANEOUS at 10:33

## 2025-04-27 NOTE — PROGRESS NOTES
[facility] - Inpatient Rehabilitation Department   Phone: [phone]    Physical Therapy    [] Initial Evaluation            [x] Daily Treatment Note         [] Discharge Summary      Patient: Edna Mcclain   : 1960   MRN: [id number]   Date of Service:  2025  Admitting Diagnosis: Closed fracture of left distal femur (HCC)  Current Admission Summary: Edna Mcclain is a 64 y.o. female with a PMHx of asthma who presented to the ER for evaluation following a fall at home.   Pt reporting accidental fall over a cord plugged into the wall.  She landed directly onto her left knee.  She felt immediate pain to the area and was unable to bear weight.  In the ER, workup was consistent with severely comminuted and displaced distal left femur fracture. S/p (L) femur retrograde IMN on .   Past Medical History:  has a past medical history of Other acne and Unspecified asthma(493.90).  Past Surgical History:  has a past surgical history that includes Tonsillectomy;  section; Adenoidectomy; and Breast fibroadenoma surgery (Left).  Discharge Recommendations: Edna Mcclain scored a  on the AM-PAC short mobility form. Current research shows that an AM-PAC score of 17 or less is typically not associated with a discharge to the patient's home setting. Based on the patient's AM-PAC score and their current functional mobility deficits, it is recommended that the patient have 5-7 sessions per week of Physical Therapy at d/c to increase the patient's independence.  At this time, this patient demonstrates complex nursing, medical, and rehabilitative needs, and would benefit from intensive rehabilitation services upon discharge from the Inpatient setting.  This patient demonstrates the ability to participate in and benefit from an intensive therapy program with a coordinated interdisciplinary team approach to foster frequent, structured, and documented communication among disciplines, who will work together

## 2025-04-27 NOTE — PROGRESS NOTES
[facility] - Inpatient Rehabilitation Department   Phone: [phone]    Occupational Therapy    [] Initial Evaluation            [x] Daily Treatment Note         [] Discharge Summary      Patient: Edna Mcclain   : 1960   MRN: [id number]   Date of Service:  2025    Admitting Diagnosis:  Closed fracture of left distal femur (HCC)  Current Admission Summary: 64 y.o. female with a PMHx of asthma who presented to the ER for evaluation following a fall at home.   Pt reporting accidental fall over a cord plugged into the wall.  She landed directly onto her left knee.  She felt immediate pain to the area and was unable to bear weight.  Denies any preceding symptoms.       In the ER, workup was consistent with severely comminuted and displaced distal left femur fracture.     Left femur retrograde nailing   Past Medical History:  has a past medical history of Other acne and Unspecified asthma(493.90).  Past Surgical History:  has a past surgical history that includes Tonsillectomy;  section; Adenoidectomy; and Breast fibroadenoma surgery (Left).    Discharge Recommendations: Edna Mcclain scored a  on the AM-PAC ADL Inpatient form. Current research shows that an AM-PAC score of 17 or less is typically not associated with a discharge to the patient's home setting. Based on the patient's AM-PAC score and their current ADL deficits, it is recommended that the patient have 5-7 sessions per week of Occupational Therapy at d/c to increase the patient's independence.  At this time, this patient demonstrates complex nursing, medical, and rehabilitative needs, and would benefit from intensive rehabilitation services upon discharge from the Inpatient setting.  This patient demonstrates the ability to participate in and benefit from an intensive therapy program with a coordinated interdisciplinary team approach to foster frequent, structured, and documented communication among disciplines, who  activities modified        Activities of Daily Living  Basic Activities of Daily Living  Grooming: setup assistance  Grooming Comments: to wash hands   Lower Extremity Dressing: dependent  Toileting: dependent.   Pt voided urine from BSC - pt able to partially assist with pericare, dependent with clothing management   Instrumental Activities of Daily Living  No IADL completed on this date.    Functional Mobility  Bed Mobility:  Supine to Sit: 2 person assistance with max A of 2    Scooting: moderate assistance  Comments:   Transfers:  Sit to stand transfer:2 person assistance with max A of 2 persons    Stand to sit transfer: maximum assistance  Comments: Patient with difficulty maintaining NWB status, assist from therapist to keep L LE off ground - 2 sit to/from stand, pt stood and bed moved/BSC placed behind her, then traded out BSC for reclining chair   Functional Mobility  No functional mobility completed on this date secondary to Unable this date.  Balance:  Static Sitting Balance: good: independent with functional balance in unsupported position  Dynamic Sitting Balance: fair (+): maintains balance at SBA/supervision without use of UE support  Static Standing Balance: poor (+): requires min (A) to maintain balance  Comments:    Other Therapeutic Interventions    Functional Outcomes           Returned to assist patient to return from reclining chair to bed with maxi move lift, pt able to scoot and position in chair to assist with placement of lift pad. Two person assist for Maxi move transfer to provide support to L LE during transfer. In bed with nursing staff present at end of session                             Cognition  Arousal/Alertness: appropriate responses to stimuli  Following Commands: follows one step commands with repetition, follows one step commands with increased time  Safety Judgement: decreased awareness of need for assistance, decreased awareness of need for safety  Problem Solving: decreased

## 2025-04-27 NOTE — PLAN OF CARE
Problem: Discharge Planning  Goal: Discharge to home or other facility with appropriate resources  4/26/2025 2119 by Yeni Prince RN  Outcome: Progressing  4/26/2025 0925 by Mariana Barton RN  Outcome: Progressing  4/26/2025 0906 by Mariana Barton RN  Outcome: Progressing  Flowsheets (Taken 4/26/2025 0851)  Discharge to home or other facility with appropriate resources: Identify barriers to discharge with patient and caregiver     Problem: Pain  Goal: Verbalizes/displays adequate comfort level or baseline comfort level  4/26/2025 2119 by Yeni Prince RN  Outcome: Progressing  4/26/2025 0925 by Mariana Barton RN  Outcome: Progressing  4/26/2025 0906 by Mariana Barton RN  Outcome: Progressing  Flowsheets  Taken 4/26/2025 0904 by Mariana Barton RN  Verbalizes/displays adequate comfort level or baseline comfort level: Encourage patient to monitor pain and request assistance  Taken 4/26/2025 0400 by Ron Hand  Verbalizes/displays adequate comfort level or baseline comfort level:   Encourage patient to monitor pain and request assistance   Assess pain using appropriate pain scale   Administer analgesics based on type and severity of pain and evaluate response   Implement non-pharmacological measures as appropriate and evaluate response   Consider cultural and social influences on pain and pain management   Notify Licensed Independent Practitioner if interventions unsuccessful or patient reports new pain  Taken 4/26/2025 0000 by Ron Hand  Verbalizes/displays adequate comfort level or baseline comfort level:   Encourage patient to monitor pain and request assistance   Assess pain using appropriate pain scale   Implement non-pharmacological measures as appropriate and evaluate response   Administer analgesics based on type and severity of pain and evaluate response   Consider cultural and social influences on pain and pain management   Notify Licensed

## 2025-04-27 NOTE — PLAN OF CARE
Problem: Discharge Planning  Goal: Discharge to home or other facility with appropriate resources  Outcome: Progressing     Problem: Pain  Goal: Verbalizes/displays adequate comfort level or baseline comfort level  Outcome: Progressing     Problem: Skin/Tissue Integrity  Goal: Skin integrity remains intact  Description: 1.  Monitor for areas of redness and/or skin breakdown2.  Assess vascular access sites hourly3.  Every 4-6 hours minimum:  Change oxygen saturation probe site4.  Every 4-6 hours:  If on nasal continuous positive airway pressure, respiratory therapy assess nares and determine need for appliance change or resting period  Outcome: Progressing

## 2025-04-27 NOTE — PROGRESS NOTES
[facility]    4/27/2025 7:02 AM        Name: Edna Mcclain .              Admitted: 4/24/2025  Primary Care Provider: No primary care provider on file. (Tel: None)      Subjective:  .  Seen this am   Comfortable when still in bed  Eating and drinking well  Worked with therapy on Saturday       Had mechanical fall at home,  tripped on a cord.  Had Left femur nailing on 4/25/25  Now NWB with brace when out of bed.  Will need 6 weeks of ASA at d/c for DVT prophylaxis            Reviewed interval ancillary notes    Current Medications  enoxaparin (LOVENOX) injection 40 mg, BID  hydrALAZINE (APRESOLINE) injection 10 mg, Q6H PRN  dextrose bolus 10% 125 mL, PRN   Or  dextrose bolus 10% 250 mL, PRN  glucagon injection 1 mg, PRN  dextrose 10 % infusion, Continuous PRN  insulin lispro (HUMALOG,ADMELOG) injection vial 0-4 Units, 4x Daily AC & HS  0.9 % sodium chloride infusion, Continuous  dexmedeTOMIDine (PRECEDEX) 400 mcg in sodium chloride 0.9 % 100 mL infusion, Continuous  sodium chloride flush 0.9 % injection 10 mL, PRN  ondansetron (ZOFRAN) injection 4 mg, Q6H PRN  senna (SENOKOT) tablet 8.6 mg, Daily PRN  acetaminophen (TYLENOL) tablet 650 mg, Q6H PRN   Or  acetaminophen (TYLENOL) suppository 650 mg, Q6H PRN  melatonin tablet 6 mg, Nightly PRN  HYDROmorphone HCl PF (DILAUDID) injection 0.5 mg, Q4H PRN  traMADol (ULTRAM) tablet 50 mg, Q6H PRN        Objective:  /83   Pulse (!) 103   Temp 98.6 °F (37 °C) (Oral)   Resp 18   Ht 1.676 m (5' 6\")   Wt 124.4 kg (274 lb 4 oz)   SpO2 95%   BMI 44.27 kg/m²     Intake/Output Summary (Last 24 hours) at 4/27/2025 0702  Last data filed at 4/26/2025 1735  Gross per 24 hour   Intake 370 ml   Output 2000 ml   Net -1630 ml      Wt Readings from Last 3 Encounters:   04/26/25 124.4 kg (274 lb 4 oz)   08/13/24 121.1 kg (267 lb)   08/08/23 124.4 kg (274 lb 4 oz)       General appearance:  Appears comfortable,  Alert and pleasant   Eyes:

## 2025-04-27 NOTE — PROGRESS NOTES
Pt. Assessment complete. Pt. Resting in bed without complaints. Left left with ice and immobilizer noted. Fall/safety precautions in place. Call light within reach. All needs met at this time.

## 2025-04-28 LAB
ANION GAP SERPL CALCULATED.3IONS-SCNC: 7 MMOL/L (ref 3–16)
BASOPHILS # BLD: 0.1 K/UL (ref 0–0.2)
BASOPHILS NFR BLD: 0.6 %
BUN SERPL-MCNC: 11 MG/DL (ref 7–20)
CALCIUM SERPL-MCNC: 8.4 MG/DL (ref 8.3–10.6)
CHLORIDE SERPL-SCNC: 103 MMOL/L (ref 99–110)
CO2 SERPL-SCNC: 27 MMOL/L (ref 21–32)
CREAT SERPL-MCNC: 0.5 MG/DL (ref 0.6–1.2)
DEPRECATED RDW RBC AUTO: 13.6 % (ref 12.4–15.4)
EOSINOPHIL # BLD: 0.1 K/UL (ref 0–0.6)
EOSINOPHIL NFR BLD: 0.8 %
GFR SERPLBLD CREATININE-BSD FMLA CKD-EPI: >90 ML/MIN/{1.73_M2}
GLUCOSE BLD-MCNC: 104 MG/DL (ref 70–99)
GLUCOSE BLD-MCNC: 132 MG/DL (ref 70–99)
GLUCOSE BLD-MCNC: 156 MG/DL (ref 70–99)
GLUCOSE BLD-MCNC: 211 MG/DL (ref 70–99)
GLUCOSE SERPL-MCNC: 137 MG/DL (ref 70–99)
HCT VFR BLD AUTO: 30.3 % (ref 36–48)
HGB BLD-MCNC: 10.5 G/DL (ref 12–16)
LYMPHOCYTES # BLD: 2.1 K/UL (ref 1–5.1)
LYMPHOCYTES NFR BLD: 16.9 %
MCH RBC QN AUTO: 31.6 PG (ref 26–34)
MCHC RBC AUTO-ENTMCNC: 34.5 G/DL (ref 31–36)
MCV RBC AUTO: 91.5 FL (ref 80–100)
MONOCYTES # BLD: 1.1 K/UL (ref 0–1.3)
MONOCYTES NFR BLD: 8.8 %
NEUTROPHILS # BLD: 8.9 K/UL (ref 1.7–7.7)
NEUTROPHILS NFR BLD: 72.9 %
PERFORMED ON: ABNORMAL
PLATELET # BLD AUTO: 223 K/UL (ref 135–450)
PMV BLD AUTO: 7.7 FL (ref 5–10.5)
POTASSIUM SERPL-SCNC: 3.6 MMOL/L (ref 3.5–5.1)
RBC # BLD AUTO: 3.31 M/UL (ref 4–5.2)
SODIUM SERPL-SCNC: 137 MMOL/L (ref 136–145)
WBC # BLD AUTO: 12.2 K/UL (ref 4–11)

## 2025-04-28 PROCEDURE — 97530 THERAPEUTIC ACTIVITIES: CPT

## 2025-04-28 PROCEDURE — 2500000003 HC RX 250 WO HCPCS: Performed by: INTERNAL MEDICINE

## 2025-04-28 PROCEDURE — 94760 N-INVAS EAR/PLS OXIMETRY 1: CPT

## 2025-04-28 PROCEDURE — 6360000002 HC RX W HCPCS: Performed by: ORTHOPAEDIC SURGERY

## 2025-04-28 PROCEDURE — 97535 SELF CARE MNGMENT TRAINING: CPT

## 2025-04-28 PROCEDURE — 1200000000 HC SEMI PRIVATE

## 2025-04-28 PROCEDURE — 6370000000 HC RX 637 (ALT 250 FOR IP): Performed by: NURSE PRACTITIONER

## 2025-04-28 PROCEDURE — 80048 BASIC METABOLIC PNL TOTAL CA: CPT

## 2025-04-28 PROCEDURE — 6370000000 HC RX 637 (ALT 250 FOR IP): Performed by: INTERNAL MEDICINE

## 2025-04-28 PROCEDURE — 97110 THERAPEUTIC EXERCISES: CPT

## 2025-04-28 PROCEDURE — 85025 COMPLETE CBC W/AUTO DIFF WBC: CPT

## 2025-04-28 PROCEDURE — 6360000002 HC RX W HCPCS: Performed by: INTERNAL MEDICINE

## 2025-04-28 PROCEDURE — 36415 COLL VENOUS BLD VENIPUNCTURE: CPT

## 2025-04-28 RX ORDER — ASPIRIN 81 MG/1
81 TABLET ORAL 2 TIMES DAILY
Qty: 60 TABLET | Refills: 0 | Status: SHIPPED | OUTPATIENT
Start: 2025-04-28 | End: 2025-04-28

## 2025-04-28 RX ORDER — TRAMADOL HYDROCHLORIDE 50 MG/1
50 TABLET ORAL EVERY 6 HOURS PRN
Qty: 20 TABLET | Refills: 0 | Status: SHIPPED | OUTPATIENT
Start: 2025-04-28 | End: 2025-05-03 | Stop reason: HOSPADM

## 2025-04-28 RX ORDER — ASPIRIN 81 MG/1
81 TABLET ORAL 2 TIMES DAILY
Qty: 84 TABLET | Refills: 0 | Status: SHIPPED | OUTPATIENT
Start: 2025-04-28 | End: 2025-06-09

## 2025-04-28 RX ADMIN — ACETAMINOPHEN 650 MG: 325 TABLET ORAL at 18:32

## 2025-04-28 RX ADMIN — INSULIN LISPRO 1 UNITS: 100 INJECTION, SOLUTION INTRAVENOUS; SUBCUTANEOUS at 13:23

## 2025-04-28 RX ADMIN — SODIUM CHLORIDE, PRESERVATIVE FREE 10 ML: 5 INJECTION INTRAVENOUS at 09:07

## 2025-04-28 RX ADMIN — HYDRALAZINE HYDROCHLORIDE 10 MG: 20 INJECTION INTRAMUSCULAR; INTRAVENOUS at 01:26

## 2025-04-28 RX ADMIN — SODIUM CHLORIDE, PRESERVATIVE FREE 10 ML: 5 INJECTION INTRAVENOUS at 21:04

## 2025-04-28 RX ADMIN — ENOXAPARIN SODIUM 40 MG: 100 INJECTION SUBCUTANEOUS at 21:03

## 2025-04-28 RX ADMIN — ENOXAPARIN SODIUM 40 MG: 100 INJECTION SUBCUTANEOUS at 09:07

## 2025-04-28 RX ADMIN — TRAMADOL HYDROCHLORIDE 50 MG: 50 TABLET, COATED ORAL at 01:30

## 2025-04-28 ASSESSMENT — PAIN SCALES - WONG BAKER
WONGBAKER_NUMERICALRESPONSE: HURTS A LITTLE BIT
WONGBAKER_NUMERICALRESPONSE: NO HURT

## 2025-04-28 ASSESSMENT — PAIN DESCRIPTION - DESCRIPTORS
DESCRIPTORS: ACHING
DESCRIPTORS: ACHING

## 2025-04-28 ASSESSMENT — PAIN DESCRIPTION - ORIENTATION
ORIENTATION: LEFT

## 2025-04-28 ASSESSMENT — PAIN SCALES - GENERAL
PAINLEVEL_OUTOF10: 0
PAINLEVEL_OUTOF10: 3
PAINLEVEL_OUTOF10: 0
PAINLEVEL_OUTOF10: 5
PAINLEVEL_OUTOF10: 0
PAINLEVEL_OUTOF10: 0

## 2025-04-28 ASSESSMENT — PAIN - FUNCTIONAL ASSESSMENT
PAIN_FUNCTIONAL_ASSESSMENT: ACTIVITIES ARE NOT PREVENTED
PAIN_FUNCTIONAL_ASSESSMENT: ACTIVITIES ARE NOT PREVENTED

## 2025-04-28 ASSESSMENT — PAIN DESCRIPTION - PAIN TYPE
TYPE: SURGICAL PAIN
TYPE: SURGICAL PAIN

## 2025-04-28 ASSESSMENT — PAIN DESCRIPTION - LOCATION
LOCATION: LEG

## 2025-04-28 NOTE — PLAN OF CARE
Problem: Discharge Planning  Goal: Discharge to home or other facility with appropriate resources  4/28/2025 1040 by Teto Cordova RN  Outcome: Progressing  4/28/2025 1039 by Teto Cordova RN  Outcome: Progressing  Flowsheets (Taken 4/28/2025 0905)  Discharge to home or other facility with appropriate resources: Identify barriers to discharge with patient and caregiver  4/28/2025 0413 by Ruby Leyva RN  Outcome: Progressing     Problem: Pain  Goal: Verbalizes/displays adequate comfort level or baseline comfort level  4/28/2025 1040 by Teto Cordova RN  Outcome: Progressing  4/28/2025 1039 by Teto Cordova RN  Outcome: Progressing  Flowsheets (Taken 4/28/2025 0900)  Verbalizes/displays adequate comfort level or baseline comfort level: Encourage patient to monitor pain and request assistance  4/28/2025 0413 by Ruby Leyva RN  Outcome: Progressing     Problem: Safety - Adult  Goal: Free from fall injury  4/28/2025 1040 by Teto Cordova RN  Outcome: Progressing  4/28/2025 1039 by Teto Cordova RN  Outcome: Progressing  4/28/2025 0413 by Ruby Leyva RN  Outcome: Progressing     Problem: Skin/Tissue Integrity  Goal: Skin integrity remains intact  Description: 1.  Monitor for areas of redness and/or skin breakdown2.  Assess vascular access sites hourly3.  Every 4-6 hours minimum:  Change oxygen saturation probe site4.  Every 4-6 hours:  If on nasal continuous positive airway pressure, respiratory therapy assess nares and determine need for appliance change or resting period  4/28/2025 1040 by Teto Cordova RN  Outcome: Progressing  4/28/2025 1039 by Teto Cordova RN  Outcome: Progressing  4/28/2025 0413 by Ruby Leyva RN  Outcome: Progressing

## 2025-04-28 NOTE — PROGRESS NOTES
[facility] - Inpatient Rehabilitation Department   Phone: [phone]    Occupational Therapy    [] Initial Evaluation            [x] Daily Treatment Note         [] Discharge Summary      Patient: Edna Mcclain   : 1960   MRN: [id number]   Date of Service:  2025    Admitting Diagnosis:  Closed fracture of left distal femur (HCC)    Current Admission Summary: 64 y.o. female with a PMHx of asthma who presented to the ER for evaluation following a fall at home.   Pt reporting accidental fall over a cord plugged into the wall.  She landed directly onto her left knee.  She felt immediate pain to the area and was unable to bear weight.  Denies any preceding symptoms.       In the ER, workup was consistent with severely comminuted and displaced distal left femur fracture.     Left femur retrograde nailing     Past Medical History:  has a past medical history of Other acne and Unspecified asthma(493.90).  Past Surgical History:  has a past surgical history that includes Tonsillectomy;  section; Adenoidectomy; and Breast fibroadenoma surgery (Left).    Discharge Recommendations: Edna Mcclain scored a 13/24 on the AM-PAC ADL Inpatient form. Current research shows that an AM-PAC score of 17 or less is typically not associated with a discharge to the patient's home setting. Based on the patient's AM-PAC score and their current ADL deficits, it is recommended that the patient have 5-7 sessions per week of Occupational Therapy at d/c to increase the patient's independence.  At this time, this patient demonstrates complex nursing, medical, and rehabilitative needs, and would benefit from intensive rehabilitation services upon discharge from the Inpatient setting.  This patient demonstrates the ability to participate in and benefit from an intensive therapy program with a coordinated interdisciplinary team approach to foster frequent, structured, and documented communication among disciplines,  decreased functional mobility, decreased ADL status, decreased endurance, decreased balance, decreased posture  Prognosis: good and fair  Clinical Assessment: Patient presents with the above deficits, which are below baseline, and would benefit from continued skilled OT to address. Patient typically independent but now requiring two person assist for transfers, difficulty maintaining NWB status, dep assist LB ADLs. Today patient stood with min of 1, mod of 1 and pulled hospital bed out and placed recliner behind patient.     Safety Interventions: patient left in chair, chair alarm in place, call light within reach, patient at risk for falls, nurse notified, and family/caregiver present    Plan  Frequency: 7 x/week  Current Treatment Recommendations: strengthening, balance training, functional mobility training, transfer training, endurance training, patient/caregiver education, ADL/self-care training, IADL training, pain management, home exercise program, safety education, equipment evaluation/education, and positioning    Goals  Patient Goals: To return home   Short Term Goals:  Time Frame: Upon discharge  Patient will complete upper body ADL at set up assistance   Patient will complete lower body ADL at moderate assistance   Patient will complete toileting at moderate assistance   Patient will complete functional transfers at moderate assistance   Patient will increase functional standing balance to 1-2 minutes modA for improved ADL completion  Patient will complete bed mobility at minimal assistance     Above goals reviewed on 4/28/2025.  All goals are ongoing at this time unless indicated above.       Therapy Session Time     Individual Group Co-treatment   Time In    1030   Time Out    1108   Minutes    38        Timed Code Treatment Minutes:   38  Total Treatment Minutes: 38        Electronically Signed By: Robyn Tan, OTR/L [id number]

## 2025-04-28 NOTE — PROGRESS NOTES
[facility] - Inpatient Rehabilitation Department   Phone: [phone]    Physical Therapy    [] Initial Evaluation            [x] Daily Treatment Note         [] Discharge Summary      Patient: Edna Mcclain   : 1960   MRN: [id number]   Date of Service:  2025  Admitting Diagnosis: Closed fracture of left distal femur (HCC)  Current Admission Summary: Edna Mcclain is a 64 y.o. female with a PMHx of asthma who presented to the ER for evaluation following a fall at home.   Pt reporting accidental fall over a cord plugged into the wall.  She landed directly onto her left knee.  She felt immediate pain to the area and was unable to bear weight.  In the ER, workup was consistent with severely comminuted and displaced distal left femur fracture. S/p (L) femur retrograde IMN on .   Past Medical History:  has a past medical history of Other acne and Unspecified asthma(493.90).  Past Surgical History:  has a past surgical history that includes Tonsillectomy;  section; Adenoidectomy; and Breast fibroadenoma surgery (Left).  Discharge Recommendations: Edna Mcclain scored a 9/24 on the AM-PAC short mobility form. Current research shows that an AM-PAC score of 17 or less is typically not associated with a discharge to the patient's home setting. Based on the patient's AM-PAC score and their current functional mobility deficits, it is recommended that the patient have 5-7 sessions per week of Physical Therapy at d/c to increase the patient's independence.  At this time, this patient demonstrates complex nursing, medical, and rehabilitative needs, and would benefit from intensive rehabilitation services upon discharge from the Inpatient setting.  This patient demonstrates the ability to participate in and benefit from an intensive therapy program with a coordinated interdisciplinary team approach to foster frequent, structured, and documented communication among disciplines, who will work together  to establish, prioritize, and achieve treatment goals. Please see assessment section for further patient specific details. If patient discharges prior to next session this note will serve as a discharge summary.  Please see below for the latest assessment towards goals.    DME Required For Discharge: DME to be determined at next level of care, DME to be determined pending patient progress  If patient chooses to return home, she would require a RW, manual w/c, drop arm commode, and a slideboard.   Precautions/Restrictions: high fall risk, weight bearing, ROM restrictions  Weight Bearing Restrictions: non weight bearing   [x] Left Lower Extremity     Required Braces/Orthotics: knee immobilizer    [x] Left  Positional Restrictions: No (L) knee ROM    Pre-Admission Information   Lives With: spouse                  Type of Home: house  Home Layout: two level, bedroom/bathroom upstairs, 12 stairs to 2nd level with B HR  Home Access:  1+1 step to enter without rails   Bathroom Layout: walk in shower  Bathroom Equipment:  no modifications  Toilet Height: standard height  Home Equipment: no prior equipment  Transfer Assistance: Independent without use of device  Ambulation Assistance:Independent without use of device  ADL Assistance: independent with all ADL's  IADL Assistance: independent with homemaking tasks  Active :        [x] Yes                 [] No  Hand Dominance: [] Left                 [x] Right  Current Employment: retired.  Occupation: Special Education  Hobbies: Writer, Bike riding  Recent Falls: 1 fall in the last 6 months (this admission)  Available Assistance at Discharge: 24 hr physical assistance available    Examination   Vision:   Vision Gross Assessment: Impaired and Vision Corrective Device: wears glasses for reading  Hearing:   WFL  Observation:   General Observation:  Pt on RA on arrival. KI on L LE, L knee dressing is clean, dry, and intact.   Posture:   Forward head   Sensation:   denies

## 2025-04-28 NOTE — PROGRESS NOTES
midline, no adenopathy.  Cardiovascular: Regular rhythm, normal S1, S2. No murmur. No edema in lower extremities  Respiratory: Not using accessory muscles. Good inspiratory effort. Clear to auscultation bilaterally, no wheeze or crackles.   GI: Abdomen soft, no tenderness, not distended, normal bowel sounds  Musculoskeletal: immobilizer in place to the left leg.  Feet are sensate and warm  ( dressing is clean dry intact.. surrounding edema noted.  Fresh ICE applied   Neurology: CN 2-12 grossly intact. No speech or motor deficits  Psych: Normal affect. Alert and oriented in time, place and person  Skin: Warm, dry, normal turgor    Labs and Tests:  CBC:   Recent Labs     04/26/25  0404 04/27/25  0536 04/28/25  0522   WBC 16.5* 14.5* 12.2*   HGB 11.9* 10.9* 10.5*    222 223     BMP:    Recent Labs     04/26/25  0404 04/27/25  0536 04/28/25  0522    137 137   K 4.2 3.8 3.6    102 103   CO2 23 26 27   BUN 14 10 11   CREATININE 0.7 0.6 0.5*   GLUCOSE 173* 152* 137*     Hepatic:   No results for input(s): \"AST\", \"ALT\", \"BILITOT\", \"ALKPHOS\" in the last 72 hours.    Invalid input(s): \"ALB\"    IMPRESSION: CT left Knee  1.  Acute comminuted and displaced fracture involving the distal femoral  metaphysis.  Acute comminuted longitudinal fracture lines distally into the  intercondylar notch.  There are multiple fragments along the medial and  lateral aspects of the femoral shaft and supracondylar fracture site.     2.  No acute fracture of the patella, tibial plateau, or proximal fibula.  Tibiofemoral joint is not dislocated.     3.  Surprisingly small amount of joint effusion/hemarthrosis given the degree  of femoral fracture.      Xray Left hip  Comminuted distal femoral fracture.     Xray Left Knee  Severely comminuted, intra-articular fracture of the distal femoral  metaphysis involving both the medial and lateral femoral condyles      AIC 5.7 %       Problem List  Principal Problem:    Closed fracture of  left distal femur (HCC)  Resolved Problems:    * No resolved hospital problems. *       Assessment & Plan:   Mechanical fall with acute left distal fracture. POD # 3 from left femur retrograde IMN on 4/25/25. Therapy recommending ARU. Await precert-I have discussed with ARU.   Hyperglycemia:   improved.  AIC 5.7 she is aware that she has pre diabetes and needs weight loss intervention.  Continue with  humalog correction and follow closely to avoid any hypoglycemia   GERD:  on PPI therapy , no current sx   HLD:  , HDL 49    Tri 53 , we discussed the need for statin therapy   HTN:  BP elevated at intervals.  Will follow , likely r/t pain etc. But may need therapy     Lives with her  in private 2 story home.        Diet: ADULT DIET; Regular; 5 carb choices (75 gm/meal)  Code:Full Code  DVT PPX      Pat Back PA-C   4/28/2025 3:46 PM

## 2025-04-28 NOTE — DISCHARGE INSTR - COC
Continuity of Care Form    Patient Name: Edna Mcclain   :  1960  MRN:  [id number]    Admit date:  2025  Discharge date:  ***    Code Status Order: Full Code   Advance Directives:    Date/Time Healthcare Directive Type of Healthcare Directive Copy in Chart Healthcare Agent Appointed Healthcare Agent's Name Healthcare Agent's Phone Number    25 [phone] No, patient does not have an advance directive for healthcare treatment  --  --  --  --  --             Admitting Physician:  Deidre Madden MD  PCP: No primary care provider on file.    Discharging Nurse: Sandra Graf  Discharging Hospital Unit/Room#: 4TN-4472/4472-01  Discharging Unit Phone Number: [phone]    Emergency Contact:   Extended Emergency Contact Information  Primary Emergency Contact: Madi Mcclain  Address: [address] [place] [address]  Home Phone: [phone]  Mobile Phone: [phone]  Relation: Spouse    Past Surgical History:  Past Surgical History:   Procedure Laterality Date    ADENOIDECTOMY      BREAST FIBROADENOMA SURGERY Left      SECTION      TONSILLECTOMY         Immunization History:     There is no immunization history on file for this patient.    Active Problems:  Patient Active Problem List   Diagnosis Code    Other acne L70.8    Closed fracture of left distal femur ([facility]) S72.402A       Isolation/Infection:   Isolation            No Isolation          Patient Infection Status    None to display         Nurse Assessment:  Last Vital Signs: /63   Pulse (!) 110   Temp 98.4 °F (36.9 °C) (Oral)   Resp 18   Ht 1.676 m (5' 6\")   Wt 124.4 kg (274 lb 4 oz)   SpO2 97%   BMI 44.27 kg/m²     Last documented pain score (0-10 scale): Pain Level: 0  Last Weight:   Wt Readings from Last 1 Encounters:   25 124.4 kg (274 lb 4 oz)     Mental Status:  oriented, alert, coherent, logical, thought processes intact, and able to concentrate and follow conversation    IV  Access:  - None    Nursing Mobility/ADLs:  Walking   Dependent  Transfer  Assisted  Bathing  Assisted  Dressing  Assisted  Toileting  Assisted  Feeding  Assisted  Med Admin  Assisted  Med Delivery   whole    Wound Care Documentation and Therapy:  Incision 04/25/25 Leg Left (Active)   Dressing Status Clean;Dry;Intact 04/28/25 1000   Dressing/Treatment Other (comment) 04/28/25 0905   Closure Staples 04/28/25 0905   Drainage Amount Small (< 25%) 04/26/25 0851   Drainage Description Serosanguinous 04/26/25 0851   Number of days: 2        Elimination:  Continence:   Bowel: No  Bladder: No  Urinary Catheter: None   Colostomy/Ileostomy/Ileal Conduit: No       Date of Last BM: 5/2/2025    Intake/Output Summary (Last 24 hours) at 4/28/2025 1038  Last data filed at 4/28/2025 0907  Gross per 24 hour   Intake 10 ml   Output 1500 ml   Net -1490 ml     I/O last 3 completed shifts:  In: -   Out: 1750 [Urine:1750]    Safety Concerns:     None    Impairments/Disabilities:      None    Nutrition Therapy:  Current Nutrition Therapy:   - Oral Diet:  General    Routes of Feeding: Oral  Liquids: Thin Liquids  Daily Fluid Restriction: no  Last Modified Barium Swallow with Video (Video Swallowing Test): not done    Treatments at the Time of Hospital Discharge:   Respiratory Treatments: ***  Oxygen Therapy:  is not on home oxygen therapy.  Ventilator:    - No ventilator support    Rehab Therapies: Physical Therapy  Weight Bearing Status/Restrictions: NWB LLE  Other Medical Equipment (for information only, NOT a DME order):  wheelchair and walker  Other Treatments: Keep incision covered x 10 days then can remove and leave open to air.  Staples to be removed POD#14.  ASA 81mg BID x 6 wks.   FOllowup with Dr.Evan De La Rosa in weeks from surgery.  [phone]    Use brace when OOB.    Patient's personal belongings (please select all that are sent with patient):  None    RN SIGNATURE:  Electronically signed by Sandra Graf RN on 5/3/25 at 1:16

## 2025-04-28 NOTE — CONSULTS
Edna Mcclain  2025  [id number]    Chief Complaint: Closed fracture of left distal femur (HCC)    Subjective   HPI: Edna Mcclain is a 64 y.o. female with PMHx notable for asthma, morbid obesity who presented on 2025  with left knee pain after mechanical fall at home.  Imaging showed severely comminuted and displaced distal left femur fracture.  Orthopedic Surgery consulted, performed ORIF with left femur retrograde nail on .  She is nonweightbearing LLE, needs brace when out of bed. Recommending DVT prophylaxis x6 weeks. Hospital course complicated by: hyperlipidemia, hypertension, hyperglycemia, leukocytosis.     Patient reports that she is doing well today.  She is having minimal left knee pain at rest, but notes worsening pain with movement.  She is aware of the need to maintain her nonweightbearing status.  She denies any other arthralgia or myalgia.  She denies any fevers or chills. Appetite is fair. Voiding via external catheter. No bowel movement yet this admission.     Past Medical History:   Diagnosis Date    Other acne     Unspecified asthma(493.90)        Past Surgical History:   Procedure Laterality Date    ADENOIDECTOMY      BREAST FIBROADENOMA SURGERY Left      SECTION      TONSILLECTOMY         Social History     Tobacco Use    Smoking status: Never    Smokeless tobacco: Never   Substance Use Topics    Alcohol use: No    Drug use: No       Prior Level of Function:   Independent for mobility, self-care, IADLs.  Lives with spouse in a two-level house with 2 steps to enter without rails, bedroom/bathroom upstairs w/ 12 stairs to second level.         Objective   Objective:  Patient Vitals for the past 24 hrs:   BP Temp Temp src Pulse Resp SpO2   25 0900 139/63 98.4 °F (36.9 °C) Oral (!) 110 18 97 %   25 0400 122/86 98.8 °F (37.1 °C) Oral (!) 101 16 96 %   25 0126 (!) 193/80 -- -- (!) 108 18 --   25 0115 (!) 173/94 -- -- (!) 106 18 --   25 (!) 169/78

## 2025-04-28 NOTE — PLAN OF CARE
Problem: Discharge Planning  Goal: Discharge to home or other facility with appropriate resources  4/28/2025 1039 by Teto Cordova RN  Outcome: Progressing  Flowsheets (Taken 4/28/2025 0905)  Discharge to home or other facility with appropriate resources: Identify barriers to discharge with patient and caregiver  4/28/2025 0413 by Ruby Leyva RN  Outcome: Progressing     Problem: Pain  Goal: Verbalizes/displays adequate comfort level or baseline comfort level  4/28/2025 1039 by Teto Cordova RN  Outcome: Progressing  Flowsheets (Taken 4/28/2025 0900)  Verbalizes/displays adequate comfort level or baseline comfort level: Encourage patient to monitor pain and request assistance  4/28/2025 0413 by Ruby Leyva RN  Outcome: Progressing     Problem: Safety - Adult  Goal: Free from fall injury  4/28/2025 1039 by Teto Cordova RN  Outcome: Progressing  4/28/2025 0413 by Ruby Leyva RN  Outcome: Progressing     Problem: Skin/Tissue Integrity  Goal: Skin integrity remains intact  Description: 1.  Monitor for areas of redness and/or skin breakdown2.  Assess vascular access sites hourly3.  Every 4-6 hours minimum:  Change oxygen saturation probe site4.  Every 4-6 hours:  If on nasal continuous positive airway pressure, respiratory therapy assess nares and determine need for appliance change or resting period  4/28/2025 1039 by Teto Cordova RN  Outcome: Progressing  4/28/2025 0413 by Ruby Leyva RN  Outcome: Progressing     Problem: Discharge Planning  Goal: Discharge to home or other facility with appropriate resources  4/28/2025 1039 by Teto Cordova RN  Outcome: Progressing  Flowsheets (Taken 4/28/2025 0905)  Discharge to home or other facility with appropriate resources: Identify barriers to discharge with patient and caregiver  4/28/2025 0413 by Ruby Leyva RN  Outcome: Progressing     Problem: Pain  Goal: Verbalizes/displays adequate comfort

## 2025-04-29 LAB
ANION GAP SERPL CALCULATED.3IONS-SCNC: 7 MMOL/L (ref 3–16)
BASOPHILS # BLD: 0 K/UL (ref 0–0.2)
BASOPHILS NFR BLD: 0.4 %
BUN SERPL-MCNC: 12 MG/DL (ref 7–20)
CALCIUM SERPL-MCNC: 8.8 MG/DL (ref 8.3–10.6)
CHLORIDE SERPL-SCNC: 106 MMOL/L (ref 99–110)
CO2 SERPL-SCNC: 28 MMOL/L (ref 21–32)
CREAT SERPL-MCNC: 0.5 MG/DL (ref 0.6–1.2)
DEPRECATED RDW RBC AUTO: 13.3 % (ref 12.4–15.4)
EOSINOPHIL # BLD: 0.3 K/UL (ref 0–0.6)
EOSINOPHIL NFR BLD: 2.8 %
GFR SERPLBLD CREATININE-BSD FMLA CKD-EPI: >90 ML/MIN/{1.73_M2}
GLUCOSE BLD-MCNC: 122 MG/DL (ref 70–99)
GLUCOSE BLD-MCNC: 122 MG/DL (ref 70–99)
GLUCOSE BLD-MCNC: 125 MG/DL (ref 70–99)
GLUCOSE BLD-MCNC: 125 MG/DL (ref 70–99)
GLUCOSE BLD-MCNC: 136 MG/DL (ref 70–99)
GLUCOSE SERPL-MCNC: 129 MG/DL (ref 70–99)
HCT VFR BLD AUTO: 31.2 % (ref 36–48)
HGB BLD-MCNC: 10.9 G/DL (ref 12–16)
LYMPHOCYTES # BLD: 2.8 K/UL (ref 1–5.1)
LYMPHOCYTES NFR BLD: 28.7 %
MCH RBC QN AUTO: 32 PG (ref 26–34)
MCHC RBC AUTO-ENTMCNC: 35 G/DL (ref 31–36)
MCV RBC AUTO: 91.4 FL (ref 80–100)
MONOCYTES # BLD: 0.8 K/UL (ref 0–1.3)
MONOCYTES NFR BLD: 8.1 %
NEUTROPHILS # BLD: 5.9 K/UL (ref 1.7–7.7)
NEUTROPHILS NFR BLD: 60 %
PERFORMED ON: ABNORMAL
PLATELET # BLD AUTO: 261 K/UL (ref 135–450)
PMV BLD AUTO: 7.7 FL (ref 5–10.5)
POTASSIUM SERPL-SCNC: 3.7 MMOL/L (ref 3.5–5.1)
RBC # BLD AUTO: 3.41 M/UL (ref 4–5.2)
SODIUM SERPL-SCNC: 141 MMOL/L (ref 136–145)
WBC # BLD AUTO: 9.8 K/UL (ref 4–11)

## 2025-04-29 PROCEDURE — APPNB45 APP NON BILLABLE 31-45 MINUTES: Performed by: NURSE PRACTITIONER

## 2025-04-29 PROCEDURE — 99024 POSTOP FOLLOW-UP VISIT: CPT | Performed by: NURSE PRACTITIONER

## 2025-04-29 PROCEDURE — 1200000000 HC SEMI PRIVATE

## 2025-04-29 PROCEDURE — 97530 THERAPEUTIC ACTIVITIES: CPT

## 2025-04-29 PROCEDURE — 6370000000 HC RX 637 (ALT 250 FOR IP): Performed by: INTERNAL MEDICINE

## 2025-04-29 PROCEDURE — 97535 SELF CARE MNGMENT TRAINING: CPT

## 2025-04-29 PROCEDURE — 6370000000 HC RX 637 (ALT 250 FOR IP): Performed by: PHYSICIAN ASSISTANT

## 2025-04-29 PROCEDURE — 6360000002 HC RX W HCPCS: Performed by: ORTHOPAEDIC SURGERY

## 2025-04-29 PROCEDURE — 80048 BASIC METABOLIC PNL TOTAL CA: CPT

## 2025-04-29 PROCEDURE — 6360000002 HC RX W HCPCS: Performed by: INTERNAL MEDICINE

## 2025-04-29 PROCEDURE — 2500000003 HC RX 250 WO HCPCS: Performed by: INTERNAL MEDICINE

## 2025-04-29 PROCEDURE — 85025 COMPLETE CBC W/AUTO DIFF WBC: CPT

## 2025-04-29 RX ORDER — METOPROLOL TARTRATE 25 MG/1
25 TABLET, FILM COATED ORAL 2 TIMES DAILY
Status: DISCONTINUED | OUTPATIENT
Start: 2025-04-29 | End: 2025-05-03 | Stop reason: HOSPADM

## 2025-04-29 RX ADMIN — HYDRALAZINE HYDROCHLORIDE 10 MG: 20 INJECTION INTRAMUSCULAR; INTRAVENOUS at 05:06

## 2025-04-29 RX ADMIN — METOPROLOL TARTRATE 25 MG: 25 TABLET, FILM COATED ORAL at 14:24

## 2025-04-29 RX ADMIN — ENOXAPARIN SODIUM 40 MG: 100 INJECTION SUBCUTANEOUS at 19:54

## 2025-04-29 RX ADMIN — SODIUM CHLORIDE, PRESERVATIVE FREE 10 ML: 5 INJECTION INTRAVENOUS at 19:55

## 2025-04-29 RX ADMIN — TRAMADOL HYDROCHLORIDE 50 MG: 50 TABLET, COATED ORAL at 14:30

## 2025-04-29 RX ADMIN — METOPROLOL TARTRATE 25 MG: 25 TABLET, FILM COATED ORAL at 19:54

## 2025-04-29 RX ADMIN — ENOXAPARIN SODIUM 40 MG: 100 INJECTION SUBCUTANEOUS at 08:37

## 2025-04-29 ASSESSMENT — PAIN SCALES - WONG BAKER
WONGBAKER_NUMERICALRESPONSE: NO HURT

## 2025-04-29 ASSESSMENT — PAIN DESCRIPTION - ORIENTATION
ORIENTATION: RIGHT;LEFT
ORIENTATION: LEFT

## 2025-04-29 ASSESSMENT — PAIN DESCRIPTION - DESCRIPTORS
DESCRIPTORS: ACHING
DESCRIPTORS: ACHING

## 2025-04-29 ASSESSMENT — PAIN DESCRIPTION - LOCATION
LOCATION: HIP
LOCATION: HIP

## 2025-04-29 ASSESSMENT — PAIN SCALES - GENERAL
PAINLEVEL_OUTOF10: 0
PAINLEVEL_OUTOF10: 0
PAINLEVEL_OUTOF10: 6
PAINLEVEL_OUTOF10: 0

## 2025-04-29 ASSESSMENT — PAIN DESCRIPTION - PAIN TYPE: TYPE: SURGICAL PAIN

## 2025-04-29 NOTE — PLAN OF CARE
Problem: Discharge Planning  Goal: Discharge to home or other facility with appropriate resources  Outcome: Progressing  Flowsheets (Taken 4/29/2025 0835 by Teto Cordova RN)  Discharge to home or other facility with appropriate resources: Identify barriers to discharge with patient and caregiver     Problem: Pain  Goal: Verbalizes/displays adequate comfort level or baseline comfort level  Outcome: Progressing  Flowsheets (Taken 4/29/2025 0815 by Teto Cordova RN)  Verbalizes/displays adequate comfort level or baseline comfort level: Encourage patient to monitor pain and request assistance     Problem: Safety - Adult  Goal: Free from fall injury  Outcome: Progressing     Problem: Skin/Tissue Integrity  Goal: Skin integrity remains intact  Description: 1.  Monitor for areas of redness and/or skin breakdown2.  Assess vascular access sites hourly3.  Every 4-6 hours minimum:  Change oxygen saturation probe site4.  Every 4-6 hours:  If on nasal continuous positive airway pressure, respiratory therapy assess nares and determine need for appliance change or resting period  Outcome: Progressing  Flowsheets (Taken 4/29/2025 0835 by eTto Cordova RN)  Skin Integrity Remains Intact: Monitor for areas of redness and/or skin breakdown

## 2025-04-29 NOTE — PROGRESS NOTES
[facility] - Inpatient Rehabilitation Department   Phone: [phone]    Physical Therapy    [] Initial Evaluation            [x] Daily Treatment Note         [] Discharge Summary      Patient: Edna Mcclain   : 1960   MRN: [id number]   Date of Service:  2025  Admitting Diagnosis: Closed fracture of left distal femur (HCC)  Current Admission Summary: Edna Mcclain is a 64 y.o. female with a PMHx of asthma who presented to the ER for evaluation following a fall at home.   Pt reporting accidental fall over a cord plugged into the wall.  She landed directly onto her left knee.  She felt immediate pain to the area and was unable to bear weight.  In the ER, workup was consistent with severely comminuted and displaced distal left femur fracture. S/p (L) femur retrograde IMN on .   Past Medical History:  has a past medical history of Other acne and Unspecified asthma(493.90).  Past Surgical History:  has a past surgical history that includes Tonsillectomy;  section; Adenoidectomy; Breast fibroadenoma surgery (Left); and Femur Surgery (Left, 2025).    Discharge Recommendations: Edna Mcclain scored a  on the AM-PAC short mobility form. Current research shows that an AM-PAC score of 17 or less is typically not associated with a discharge to the patient's home setting. Based on the patient's AM-PAC score and their current functional mobility deficits, it is recommended that the patient have 5-7 sessions per week of Physical Therapy at d/c to increase the patient's independence.  At this time, this patient demonstrates complex nursing, medical, and rehabilitative needs, and would benefit from intensive rehabilitation services upon discharge from the Inpatient setting.  This patient demonstrates the ability to participate in and benefit from an intensive therapy program with a coordinated interdisciplinary team approach to foster frequent, structured, and documented communication among

## 2025-04-29 NOTE — PROGRESS NOTES
[facility] Orthopedic Surgery   Progress Note      SUBJECTIVE: She notes that her leg is feeling better.  Denies new issues      OBJECTIVE:  BP (!) 172/88   Pulse 96   Temp 98.2 °F (36.8 °C) (Oral)   Resp 16   Ht 1.676 m (5' 6\")   Wt 124.4 kg (274 lb 4 oz)   SpO2 97%   BMI 44.27 kg/m²   Patient is awake, alert, and in no acute distress.   Knee immobilizer intact.  Sensation is intact to light touch throughout the Left leg.  Dorsiflexion / plantarflexion intact.   The Left leg is warm and well perfused.    CBC:   Lab Results   Component Value Date/Time    WBC 9.8 04/29/2025 05:26 AM    RBC 3.41 04/29/2025 05:26 AM    HGB 10.9 04/29/2025 05:26 AM    HCT 31.2 04/29/2025 05:26 AM    MCV 91.4 04/29/2025 05:26 AM    MCH 32.0 04/29/2025 05:26 AM    MCHC 35.0 04/29/2025 05:26 AM    RDW 13.3 04/29/2025 05:26 AM     04/29/2025 05:26 AM    MPV 7.7 04/29/2025 05:26 AM       PT/INR:    Lab Results   Component Value Date/Time    PROTIME 13.9 04/25/2025 05:38 AM    INR 1.05 04/25/2025 05:38 AM       Chem Basic:   Lab Results   Component Value Date/Time     04/29/2025 05:26 AM    K 3.7 04/29/2025 05:26 AM     04/29/2025 05:26 AM    CO2 28 04/29/2025 05:26 AM    GLUCOSE 129 04/29/2025 05:26 AM    BUN 12 04/29/2025 05:26 AM    CREATININE 0.5 04/29/2025 05:26 AM           ASSESSMENT:  POD#4 s/p left femur retrograde IMN      PLAN:    --Pain control; Rx for dc in chart.   -- DVT prophylaxis; Lovenox now - ASA 81mg BID x 30 days at dc  -- Nonweightbearing left lower extremity  -- Continue knee immobilizer when OOB.  Can open for skin checks and hygiene.  -- PT/OT consult  -- Dispo per primary team    Electronically signed by CECILE Turner CNP on 4/29/2025 at 1:51 PM

## 2025-04-29 NOTE — PROGRESS NOTES
[facility] - Inpatient Rehabilitation Department   Phone: [phone]    Occupational Therapy    [] Initial Evaluation            [x] Daily Treatment Note         [] Discharge Summary      Patient: Edna Mcclain   : 1960   MRN: [id number]   Date of Service:  2025    Admitting Diagnosis:  Closed fracture of left distal femur (HCC)    Current Admission Summary: 64 y.o. female with a PMHx of asthma who presented to the ER for evaluation following a fall at home.   Pt reporting accidental fall over a cord plugged into the wall.  She landed directly onto her left knee.  She felt immediate pain to the area and was unable to bear weight.  Denies any preceding symptoms.       In the ER, workup was consistent with severely comminuted and displaced distal left femur fracture.     Left femur retrograde nailing     Past Medical History:  has a past medical history of Other acne and Unspecified asthma(493.90).  Past Surgical History:  has a past surgical history that includes Tonsillectomy;  section; Adenoidectomy; and Breast fibroadenoma surgery (Left).    Discharge Recommendations: Edna Mcclain scored a 13/24 on the AM-PAC ADL Inpatient form. Current research shows that an AM-PAC score of 17 or less is typically not associated with a discharge to the patient's home setting. Based on the patient's AM-PAC score and their current ADL deficits, it is recommended that the patient have 5-7 sessions per week of Occupational Therapy at d/c to increase the patient's independence.  At this time, this patient demonstrates complex nursing, medical, and rehabilitative needs, and would benefit from intensive rehabilitation services upon discharge from the Inpatient setting.  This patient demonstrates the ability to participate in and benefit from an intensive therapy program with a coordinated interdisciplinary team approach to foster frequent, structured, and documented communication among disciplines,

## 2025-04-29 NOTE — PROGRESS NOTES
[facility]    4/29/2025 10:45 AM        Name: Edna Mcclain .              Admitted: 4/24/2025  Primary Care Provider: No primary care provider on file. (Tel: None)      Subjective:  .  Seen this am. She is in bed. Pain controlled. Dry mouth. Passing flatus but no BM since fall.       Had mechanical fall at home,  tripped on a cord.  Had Left femur nailing on 4/25/25  Now NWB with brace when out of bed.  Will need 6 weeks of ASA at d/c for DVT prophylaxis            Reviewed interval ancillary notes    Current Medications  oxyCODONE (ROXICODONE) immediate release tablet 5 mg, Q4H PRN  calcium carbonate (TUMS) chewable tablet 500 mg, TID PRN  enoxaparin (LOVENOX) injection 40 mg, BID  hydrALAZINE (APRESOLINE) injection 10 mg, Q6H PRN  dextrose bolus 10% 125 mL, PRN   Or  dextrose bolus 10% 250 mL, PRN  glucagon injection 1 mg, PRN  dextrose 10 % infusion, Continuous PRN  insulin lispro (HUMALOG,ADMELOG) injection vial 0-4 Units, 4x Daily AC & HS  sodium chloride flush 0.9 % injection 10 mL, PRN  ondansetron (ZOFRAN) injection 4 mg, Q6H PRN  senna (SENOKOT) tablet 8.6 mg, Daily PRN  acetaminophen (TYLENOL) tablet 650 mg, Q6H PRN   Or  acetaminophen (TYLENOL) suppository 650 mg, Q6H PRN  melatonin tablet 6 mg, Nightly PRN  HYDROmorphone HCl PF (DILAUDID) injection 0.5 mg, Q4H PRN  traMADol (ULTRAM) tablet 50 mg, Q6H PRN        Objective:  BP (!) 156/65   Pulse 98   Temp 98.7 °F (37.1 °C) (Oral)   Resp 18   Ht 1.676 m (5' 6\")   Wt 124.4 kg (274 lb 4 oz)   SpO2 96%   BMI 44.27 kg/m²     Intake/Output Summary (Last 24 hours) at 4/29/2025 1045  Last data filed at 4/28/2025 1815  Gross per 24 hour   Intake 360 ml   Output 800 ml   Net -440 ml      Wt Readings from Last 3 Encounters:   04/26/25 124.4 kg (274 lb 4 oz)   08/13/24 121.1 kg (267 lb)   08/08/23 124.4 kg (274 lb 4 oz)       General appearance:  Appears comfortable,  Alert and pleasant   Eyes: Sclera clear. Pupils

## 2025-04-29 NOTE — PLAN OF CARE
Problem: Discharge Planning  Goal: Discharge to home or other facility with appropriate resources  4/28/2025 2306 by Steven Velazquez RN  Outcome: Progressing     Problem: Pain  Goal: Verbalizes/displays adequate comfort level or baseline comfort level  4/28/2025 2306 by Steven Velazquez RN  Outcome: Progressing  Flowsheets (Taken 4/28/2025 1815 by Teto Cordova RN)  Verbalizes/displays adequate comfort level or baseline comfort level: Encourage patient to monitor pain and request assistance     Problem: Safety - Adult  Goal: Free from fall injury  4/28/2025 2306 by Steven Velazquez RN  Outcome: Progressing     Problem: Skin/Tissue Integrity  Goal: Skin integrity remains intact  Description: 1.  Monitor for areas of redness and/or skin breakdown2.  Assess vascular access sites hourly3.  Every 4-6 hours minimum:  Change oxygen saturation probe site4.  Every 4-6 hours:  If on nasal continuous positive airway pressure, respiratory therapy assess nares and determine need for appliance change or resting period  4/28/2025 2306 by Steven Velazquez RN  Outcome: Progressing  Flowsheets  Taken 4/28/2025 1042 by Teto Cordova RN  Skin Integrity Remains Intact: Monitor for areas of redness and/or skin breakdown  Taken 4/28/2025 1041 by Teto Cordova RN  Skin Integrity Remains Intact: Monitor for areas of redness and/or skin breakdown

## 2025-04-29 NOTE — CARE COORDINATION
Prior Authorization submitted for ARU approval with a reference number: [id number]     ARU will continue to follow progress and update discharge plan as needed.    LATHA AntonioN, .[phone]

## 2025-04-30 LAB
GLUCOSE BLD-MCNC: 125 MG/DL (ref 70–99)
GLUCOSE BLD-MCNC: 125 MG/DL (ref 70–99)
GLUCOSE BLD-MCNC: 132 MG/DL (ref 70–99)
GLUCOSE BLD-MCNC: 166 MG/DL (ref 70–99)
PERFORMED ON: ABNORMAL

## 2025-04-30 PROCEDURE — 97530 THERAPEUTIC ACTIVITIES: CPT

## 2025-04-30 PROCEDURE — 6370000000 HC RX 637 (ALT 250 FOR IP): Performed by: PHYSICIAN ASSISTANT

## 2025-04-30 PROCEDURE — 6360000002 HC RX W HCPCS: Performed by: INTERNAL MEDICINE

## 2025-04-30 PROCEDURE — 97535 SELF CARE MNGMENT TRAINING: CPT

## 2025-04-30 PROCEDURE — 1200000000 HC SEMI PRIVATE

## 2025-04-30 PROCEDURE — 97116 GAIT TRAINING THERAPY: CPT

## 2025-04-30 PROCEDURE — 6360000002 HC RX W HCPCS: Performed by: ORTHOPAEDIC SURGERY

## 2025-04-30 PROCEDURE — 6370000000 HC RX 637 (ALT 250 FOR IP): Performed by: NURSE PRACTITIONER

## 2025-04-30 PROCEDURE — 97542 WHEELCHAIR MNGMENT TRAINING: CPT

## 2025-04-30 RX ORDER — DOCUSATE SODIUM 100 MG/1
100 CAPSULE, LIQUID FILLED ORAL NIGHTLY PRN
Status: DISCONTINUED | OUTPATIENT
Start: 2025-04-30 | End: 2025-05-03 | Stop reason: HOSPADM

## 2025-04-30 RX ORDER — POLYETHYLENE GLYCOL 3350 17 G/17G
17 POWDER, FOR SOLUTION ORAL DAILY
Status: DISCONTINUED | OUTPATIENT
Start: 2025-04-30 | End: 2025-05-03 | Stop reason: HOSPADM

## 2025-04-30 RX ADMIN — ENOXAPARIN SODIUM 40 MG: 100 INJECTION SUBCUTANEOUS at 20:46

## 2025-04-30 RX ADMIN — OXYCODONE 5 MG: 5 TABLET ORAL at 10:04

## 2025-04-30 RX ADMIN — POLYETHYLENE GLYCOL 3350 17 G: 17 POWDER, FOR SOLUTION ORAL at 12:34

## 2025-04-30 RX ADMIN — ANTACID TABLETS 500 MG: 500 TABLET, CHEWABLE ORAL at 10:07

## 2025-04-30 RX ADMIN — METOPROLOL TARTRATE 25 MG: 25 TABLET, FILM COATED ORAL at 10:04

## 2025-04-30 RX ADMIN — METOPROLOL TARTRATE 25 MG: 25 TABLET, FILM COATED ORAL at 19:56

## 2025-04-30 RX ADMIN — ENOXAPARIN SODIUM 40 MG: 100 INJECTION SUBCUTANEOUS at 10:08

## 2025-04-30 RX ADMIN — OXYCODONE 5 MG: 5 TABLET ORAL at 19:56

## 2025-04-30 RX ADMIN — HYDRALAZINE HYDROCHLORIDE 10 MG: 20 INJECTION INTRAMUSCULAR; INTRAVENOUS at 05:04

## 2025-04-30 ASSESSMENT — PAIN DESCRIPTION - FREQUENCY
FREQUENCY: INTERMITTENT
FREQUENCY: INTERMITTENT

## 2025-04-30 ASSESSMENT — PAIN DESCRIPTION - DIRECTION: RADIATING_TOWARDS: LEFT KNEE

## 2025-04-30 ASSESSMENT — PAIN DESCRIPTION - PAIN TYPE
TYPE: SURGICAL PAIN
TYPE: SURGICAL PAIN

## 2025-04-30 ASSESSMENT — PAIN DESCRIPTION - ORIENTATION
ORIENTATION: LEFT
ORIENTATION: LEFT

## 2025-04-30 ASSESSMENT — PAIN SCALES - WONG BAKER
WONGBAKER_NUMERICALRESPONSE: HURTS LITTLE MORE
WONGBAKER_NUMERICALRESPONSE: HURTS A LITTLE BIT
WONGBAKER_NUMERICALRESPONSE: NO HURT
WONGBAKER_NUMERICALRESPONSE: HURTS A LITTLE BIT

## 2025-04-30 ASSESSMENT — PAIN SCALES - GENERAL
PAINLEVEL_OUTOF10: 4
PAINLEVEL_OUTOF10: 0
PAINLEVEL_OUTOF10: 0
PAINLEVEL_OUTOF10: 4
PAINLEVEL_OUTOF10: 2
PAINLEVEL_OUTOF10: 2

## 2025-04-30 ASSESSMENT — PAIN DESCRIPTION - DESCRIPTORS
DESCRIPTORS: ACHING
DESCRIPTORS: ACHING

## 2025-04-30 ASSESSMENT — PAIN DESCRIPTION - LOCATION
LOCATION: HIP;LEG
LOCATION: LEG

## 2025-04-30 ASSESSMENT — PAIN DESCRIPTION - ONSET
ONSET: ON-GOING
ONSET: ON-GOING

## 2025-04-30 ASSESSMENT — PAIN - FUNCTIONAL ASSESSMENT: PAIN_FUNCTIONAL_ASSESSMENT: ACTIVITIES ARE NOT PREVENTED

## 2025-04-30 NOTE — PROGRESS NOTES
[facility] - Inpatient Rehabilitation Department   Phone: [phone]    Physical Therapy    [] Initial Evaluation            [x] Daily Treatment Note         [] Discharge Summary      Patient: Edna Mcclani   : 1960   MRN: [id number]   Date of Service:  2025  Admitting Diagnosis: Closed fracture of left distal femur (HCC)  Current Admission Summary: Edna Mcclain is a 64 y.o. female with a PMHx of asthma who presented to the ER for evaluation following a fall at home.   Pt reporting accidental fall over a cord plugged into the wall.  She landed directly onto her left knee.  She felt immediate pain to the area and was unable to bear weight.  In the ER, workup was consistent with severely comminuted and displaced distal left femur fracture. S/p (L) femur retrograde IMN on .   Past Medical History:  has a past medical history of Other acne and Unspecified asthma(493.90).  Past Surgical History:  has a past surgical history that includes Tonsillectomy;  section; Adenoidectomy; Breast fibroadenoma surgery (Left); and Femur Surgery (Left, 2025).    Discharge Recommendations: Edna Mcclain scored a 13/24 on the AM-PAC short mobility form. Current research shows that an AM-PAC score of 17 or less is typically not associated with a discharge to the patient's home setting. Based on the patient's AM-PAC score and their current functional mobility deficits, it is recommended that the patient have 5-7 sessions per week of Physical Therapy at d/c to increase the patient's independence.  At this time, this patient demonstrates complex nursing, medical, and rehabilitative needs, and would benefit from intensive rehabilitation services upon discharge from the Inpatient setting.  This patient demonstrates the ability to participate in and benefit from an intensive therapy program with a coordinated interdisciplinary team approach to foster frequent, structured, and documented communication among  neuromuscular re-education, wheelchair mobility training, patient/caregiver education, pain management, home exercise program, safety education, equipment evaluation/education, and positioning    Goals  Patient Goals: To return home   Short Term Goals:  Time Frame: Upon discharge   Patient will complete bed mobility at contact guard assistance   Patient will complete transfers at moderate assistance   Patient will complete manual w/c propulsion 50 ft at stand by assistance  Patient will maintain standing balance for 2 minutes with no more than moderate assistance     Above goals reviewed on 4/30/2025.  All goals are ongoing at this time unless indicated above.    Therapy Session Time      Individual Group Co-treatment   Time In     0759    Time Out     0922   Minutes     83     Timed Code Treatment Minutes:  83 Minutes    Total Treatment Minutes:   83 Minutes     Electronically Signed By: Tova Hunter PT, DPT, [id number]

## 2025-04-30 NOTE — PROGRESS NOTES
10:18 AM  Shift assessment completed and charted. VSS. A/o x4. Standard safety measures in place. Patient up to chair, immobilizer in place. SpO2 > 90% on room air. PRN oxy given for left leg, knee, and hip soreness. Patient reports constipation, RN paged NP for orders. Pt stable and denied needs when writer left room.

## 2025-04-30 NOTE — PROGRESS NOTES
[facility]    4/30/2025 9:40 AM        Name: Edna Mcclain .              Admitted: 4/24/2025  Primary Care Provider: No primary care provider on file. (Tel: None)      Had mechanical fall at home,  tripped on a cord.  Had Left femur nailing on 4/25/25  Now NWB with brace when out of bed.  Will need 6 weeks of ASA at d/c for DVT prophylaxis     Subjective:    She is resting in chair.  Pain controlled.  Was able to walk short distance with therapy today, showing improvements.  Hoping to get to ARU for intensive therapy.  Having constipation.     Reviewed interval ancillary notes    Current Medications  metoprolol tartrate (LOPRESSOR) tablet 25 mg, BID  oxyCODONE (ROXICODONE) immediate release tablet 5 mg, Q4H PRN  calcium carbonate (TUMS) chewable tablet 500 mg, TID PRN  enoxaparin (LOVENOX) injection 40 mg, BID  hydrALAZINE (APRESOLINE) injection 10 mg, Q6H PRN  dextrose bolus 10% 125 mL, PRN   Or  dextrose bolus 10% 250 mL, PRN  glucagon injection 1 mg, PRN  dextrose 10 % infusion, Continuous PRN  insulin lispro (HUMALOG,ADMELOG) injection vial 0-4 Units, 4x Daily AC & HS  sodium chloride flush 0.9 % injection 10 mL, PRN  ondansetron (ZOFRAN) injection 4 mg, Q6H PRN  senna (SENOKOT) tablet 8.6 mg, Daily PRN  acetaminophen (TYLENOL) tablet 650 mg, Q6H PRN   Or  acetaminophen (TYLENOL) suppository 650 mg, Q6H PRN  melatonin tablet 6 mg, Nightly PRN  HYDROmorphone HCl PF (DILAUDID) injection 0.5 mg, Q4H PRN  traMADol (ULTRAM) tablet 50 mg, Q6H PRN        Objective:  BP (!) 164/76   Pulse 77   Temp 98.3 °F (36.8 °C) (Oral)   Resp 18   Ht 1.676 m (5' 6\")   Wt 124.4 kg (274 lb 4 oz)   SpO2 95%   BMI 44.27 kg/m²     Intake/Output Summary (Last 24 hours) at 4/30/2025 0940  Last data filed at 4/30/2025 0415  Gross per 24 hour   Intake 480 ml   Output 3000 ml   Net -2520 ml      Wt Readings from Last 3 Encounters:   04/26/25 124.4 kg (274 lb 4 oz)   08/13/24 121.1 kg (267 lb)

## 2025-04-30 NOTE — PROGRESS NOTES
communication among disciplines, who will work together to establish, prioritize, and achieve treatment goals. Please see assessment section for further patient specific details.    If patient discharges prior to next session this note will serve as a discharge summary.  Please see below for the latest assessment towards goals.       DME Required For Discharge: rolling walker, wheelchair, bariatric drop arm commode    Precautions/Restrictions: high fall risk, up as tolerated  Weight Bearing Restrictions: non weight bearing  [] Right Upper Extremity  [] Left Upper Extremity [] Right Lower Extremity  [x] Left Lower Extremity     Required Braces/Orthotics: knee immobilizer   [] Right  [] Left  Positional Restrictions:no positional restrictions    Pre-Admission Information   Lives With: spouse    Type of Home: house  Home Layout: two level, bedroom/bathroom upstairs, 12 stairs to 2nd level with B HR  Home Access:  1+1 step to enter without rails   Bathroom Layout: walk in shower  Bathroom Equipment:  no modifications  Toilet Height: standard height  Home Equipment: no prior equipment  Transfer Assistance: Independent without use of device  Ambulation Assistance:Independent without use of device  ADL Assistance: independent with all ADL's  IADL Assistance: independent with homemaking tasks  Active :        [x] Yes  [] No  Hand Dominance: [] Left  [x] Right  Current Employment: retired.  Occupation: Special Education  Hobbies: Writer, Bike riding  Recent Falls: 1 fall in the last 6 months (this admission)    Available Assistance at Discharge: 24 hr physical assistance available    Examination   Vision:   Vision Gross Assessment: Impaired and Vision Corrective Device: wears glasses for reading  Hearing:   WFL        Subjective  General: Patient supine in bed on arrival and agreeable for session.  present during treatment session. Cotx with PT to maximize function and safety.   Pain: 2/10.  Location: L knee at  home   Short Term Goals:  Time Frame: Upon discharge  Patient will complete upper body ADL at set up assistance   Patient will complete lower body ADL at moderate assistance   Patient will complete toileting at moderate assistance   Patient will complete functional transfers at moderate assistance goal met 4/30, upgrade SBA  Patient will increase functional standing balance to 1-2 minutes modA for improved ADL completion, goal met 4/30, upgrade SBA  Patient will complete bed mobility at minimal assistance, goal met 4/30, upgrade mod I    Above goals reviewed on 4/30/2025.  All goals are ongoing at this time unless indicated above.       Therapy Session Time     Individual Group Co-treatment   Time In    0759   Time Out    0922   Minutes    83        Timed Code Treatment Minutes:   83  Total Treatment Minutes: 83 minutes       Electronically Signed By: Carolynn Bermudez, OT, [id number]    Addendum 3:54 to update mobility Carolynn PARRISH OTR/L [id number]

## 2025-05-01 LAB
GLUCOSE BLD-MCNC: 113 MG/DL (ref 70–99)
GLUCOSE BLD-MCNC: 132 MG/DL (ref 70–99)
GLUCOSE BLD-MCNC: 144 MG/DL (ref 70–99)
GLUCOSE BLD-MCNC: 145 MG/DL (ref 70–99)
PERFORMED ON: ABNORMAL

## 2025-05-01 PROCEDURE — 97110 THERAPEUTIC EXERCISES: CPT

## 2025-05-01 PROCEDURE — 97530 THERAPEUTIC ACTIVITIES: CPT

## 2025-05-01 PROCEDURE — 6370000000 HC RX 637 (ALT 250 FOR IP): Performed by: PHYSICIAN ASSISTANT

## 2025-05-01 PROCEDURE — 1200000000 HC SEMI PRIVATE

## 2025-05-01 PROCEDURE — 97535 SELF CARE MNGMENT TRAINING: CPT

## 2025-05-01 PROCEDURE — 6360000002 HC RX W HCPCS: Performed by: ORTHOPAEDIC SURGERY

## 2025-05-01 PROCEDURE — 97116 GAIT TRAINING THERAPY: CPT

## 2025-05-01 PROCEDURE — 6370000000 HC RX 637 (ALT 250 FOR IP): Performed by: NURSE PRACTITIONER

## 2025-05-01 RX ORDER — ATORVASTATIN CALCIUM 20 MG/1
20 TABLET, FILM COATED ORAL NIGHTLY
Status: DISCONTINUED | OUTPATIENT
Start: 2025-05-01 | End: 2025-05-03 | Stop reason: HOSPADM

## 2025-05-01 RX ADMIN — METOPROLOL TARTRATE 25 MG: 25 TABLET, FILM COATED ORAL at 20:47

## 2025-05-01 RX ADMIN — OXYCODONE 5 MG: 5 TABLET ORAL at 09:13

## 2025-05-01 RX ADMIN — ATORVASTATIN CALCIUM 20 MG: 20 TABLET, FILM COATED ORAL at 20:47

## 2025-05-01 RX ADMIN — ENOXAPARIN SODIUM 40 MG: 100 INJECTION SUBCUTANEOUS at 20:47

## 2025-05-01 RX ADMIN — METOPROLOL TARTRATE 25 MG: 25 TABLET, FILM COATED ORAL at 09:10

## 2025-05-01 RX ADMIN — ENOXAPARIN SODIUM 40 MG: 100 INJECTION SUBCUTANEOUS at 09:11

## 2025-05-01 ASSESSMENT — PAIN SCALES - WONG BAKER: WONGBAKER_NUMERICALRESPONSE: NO HURT

## 2025-05-01 ASSESSMENT — PAIN DESCRIPTION - LOCATION: LOCATION: LEG;KNEE;HIP

## 2025-05-01 ASSESSMENT — PAIN DESCRIPTION - DESCRIPTORS: DESCRIPTORS: ACHING;SORE

## 2025-05-01 ASSESSMENT — PAIN SCALES - GENERAL
PAINLEVEL_OUTOF10: 0
PAINLEVEL_OUTOF10: 2

## 2025-05-01 ASSESSMENT — PAIN - FUNCTIONAL ASSESSMENT: PAIN_FUNCTIONAL_ASSESSMENT: ACTIVITIES ARE NOT PREVENTED

## 2025-05-01 ASSESSMENT — PAIN DESCRIPTION - ORIENTATION: ORIENTATION: LEFT

## 2025-05-01 NOTE — PROGRESS NOTES
Nutrition Note    RECOMMENDATIONS  PO Diet: [facility]  ONS: N/A     ASSESSMENT   Nutrition intervention triggered for LOS.  Pt receives a CCC diet with po intake greater than 50% of meals.  No unintentional wt loss noted.  Encourage protein to promote healing as precert pending for ARU.  Pt is at low risk for nutrition compromise.       Malnutrition Status: No malnutrition     NUTRITION DIAGNOSIS   Increased nutrient needs related to increase demand for energy/nutrients as evidenced by s/p surgery    Goals: PO intake 50% or greater     NUTRITION RELATED FINDINGS  Objective: LBM 4/30 (daily glycolax & senna ordered d/t recent constipation), trace edema LLE; gluc 145  Wounds: Surgical Incision    CURRENT NUTRITION THERAPIES  ADULT DIET; Regular; 5 carb choices (75 gm/meal)       PO Intake: 51-75%, %   PO Supplement Intake:None Ordered    ANTHROPOMETRICS  Current Height: 167.6 cm (5' 6\")  Current Weight - Scale: 124.4 kg (274 lb 4 oz)      EDUCATION  Education/Counseling not indicated     The patient will be monitored per nutrition standards of care. Consult dietitian if additional nutrition interventions are needed prior to RD reassessment.     Karuna Dumont, KRUNAL, LD    Contact: [phone]

## 2025-05-01 NOTE — PROGRESS NOTES
[facility] - Inpatient Rehabilitation Department   Phone: [phone]    Physical Therapy    [] Initial Evaluation            [x] Daily Treatment Note         [] Discharge Summary      Patient: Edna Mcclain   : 1960   MRN: [id number]   Date of Service:  2025  Admitting Diagnosis: Closed fracture of left distal femur (HCC)  Current Admission Summary: Edna Mcclain is a 64 y.o. female with a PMHx of asthma who presented to the ER for evaluation following a fall at home.   Pt reporting accidental fall over a cord plugged into the wall.  She landed directly onto her left knee.  She felt immediate pain to the area and was unable to bear weight.  In the ER, workup was consistent with severely comminuted and displaced distal left femur fracture. S/p (L) femur retrograde IMN on .   Past Medical History:  has a past medical history of Other acne and Unspecified asthma(493.90).  Past Surgical History:  has a past surgical history that includes Tonsillectomy;  section; Adenoidectomy; Breast fibroadenoma surgery (Left); and Femur Surgery (Left, 2025).    Discharge Recommendations: Edna Mcclain scored a 13/24 on the AM-PAC short mobility form. Current research shows that an AM-PAC score of 17 or less is typically not associated with a discharge to the patient's home setting. Based on the patient's AM-PAC score and their current functional mobility deficits, it is recommended that the patient have 5-7 sessions per week of Physical Therapy at d/c to increase the patient's independence.  At this time, this patient demonstrates complex nursing, medical, and rehabilitative needs, and would benefit from intensive rehabilitation services upon discharge from the Inpatient setting.  This patient demonstrates the ability to participate in and benefit from an intensive therapy program with a coordinated interdisciplinary team approach to foster frequent, structured, and documented communication among

## 2025-05-01 NOTE — INTERDISCIPLINARY ROUNDS
Spiritual Health History and Assessment/Progress Note  [facility]    Interdisciplinary rounds,  ,  ,      Name: Edna Mcclain MRN: [id number]    Age: 64 y.o.     Sex: female   Language: English   Latter day: Mosque   Closed fracture of left distal femur (HCC)     Date: 5/1/2025            Total Time Calculated: 30 min              Spiritual Assessment began in [facility] 4 TOWER ORTHO/NEURO NURSING        Referral/Consult From: Rounding   Encounter Overview/Reason: Interdisciplinary rounds  Service Provided For: Patient    Ronna, Belief, Meaning:   Patient identifies as spiritual, is connected with a ronna tradition or spiritual practice, and has beliefs or practices that help with coping during difficult times  Family/Friends identify as spiritual, are connected with a ronna tradition or spiritual practice, and have beliefs or practices that help with coping during difficult times      Importance and Influence:  Patient has spiritual/personal beliefs that influence decisions regarding their health  Family/Friends have spiritual/personal beliefs that influence decisions regarding the patient's health    Community:  Patient is connected with a spiritual community and feels well-supported. Support system includes: Spouse/Partner, Children, and Ronna Community  Family/Friends are connected with a spiritual community: and feel well-supported. Support system includes: Spouse/Partner, Children, and Ronna Community    Assessment and Plan of Care:     Patient Interventions include: Facilitated expression of thoughts and feelings, Explored spiritual coping/struggle/distress, Engaged in theological reflection, and Affirmed coping skills/support systems  Family/Friends Interventions include: Facilitated expression of thoughts and feelings, Explored spiritual coping/struggle/distress, Engaged in theological reflection, and Affirmed coping skills/support systems    Patient Plan of Care: Spiritual Care available upon

## 2025-05-01 NOTE — PROGRESS NOTES
9:22 AM  Shift assessment completed and charted. VSS. A/o x4. Standard safety measures in place. SpO2 > 90% on room. PRN oxy given prior to PT/OT per patient request. Immobilizer in place. Dressing C/D?I with scant amount of old drainage. Patient had large BM yesterday. Pt stable and denied needs when writer left room.    11:54 AM  Patient refused noon vital signs, RN educated patient and she verbalized her understanding of education.

## 2025-05-01 NOTE — PLAN OF CARE
Problem: Discharge Planning  Goal: Discharge to home or other facility with appropriate resources  4/30/2025 2104 by Des Mejia RN  Outcome: Progressing     Problem: Pain  Goal: Verbalizes/displays adequate comfort level or baseline comfort level  4/30/2025 2104 by Des Mejia RN  Outcome: Progressing     Problem: Safety - Adult  Goal: Free from fall injury  4/30/2025 2104 by Des Mejia, RN  Outcome: Progressing     Problem: Skin/Tissue Integrity  Goal: Skin integrity remains intact  Description: 1.  Monitor for areas of redness and/or skin breakdown2.  Assess vascular access sites hourly3.  Every 4-6 hours minimum:  Change oxygen saturation probe site4.  Every 4-6 hours:  If on nasal continuous positive airway pressure, respiratory therapy assess nares and determine need for appliance change or resting period  4/30/2025 2104 by Dse Mejia, RN  Outcome: Progressing

## 2025-05-01 NOTE — PROGRESS NOTES
[facility] - Inpatient Rehabilitation Department   Phone: [phone]    Occupational Therapy    [] Initial Evaluation            [x] Daily Treatment Note         [] Discharge Summary      Patient: Edna Mcclain   : 1960   MRN: [id number]   Date of Service:  2025    Admitting Diagnosis:  Closed fracture of left distal femur (HCC)    Current Admission Summary: 64 y.o. female with a PMHx of asthma who presented to the ER for evaluation following a fall at home.   Pt reporting accidental fall over a cord plugged into the wall.  She landed directly onto her left knee.  She felt immediate pain to the area and was unable to bear weight.  Denies any preceding symptoms.       In the ER, workup was consistent with severely comminuted and displaced distal left femur fracture.     Left femur retrograde nailing     Past Medical History:  has a past medical history of Other acne and Unspecified asthma(493.90).  Past Surgical History:  has a past surgical history that includes Tonsillectomy;  section; Adenoidectomy; Breast fibroadenoma surgery (Left); and Femur Surgery (Left, 2025).    Discharge Recommendations: Edna Mcclain scored a 16/24 on the AM-PAC ADL Inpatient form. Current research shows that an AM-PAC score of 17 or less is typically not associated with a discharge to the patient's home setting. Based on the patient's AM-PAC score and their current ADL deficits, it is recommended that the patient have 5-7 sessions per week of Occupational Therapy at d/c to increase the patient's independence.  At this time, this patient demonstrates complex nursing, medical, and rehabilitative needs, and would benefit from intensive rehabilitation services upon discharge from the Inpatient setting.  This patient demonstrates the ability to participate in and benefit from an intensive therapy program with a coordinated interdisciplinary team approach to foster frequent, structured, and documented  ambulation  Pain: 0/10 at rest, no c/o during mobility  Pain Interventions: pain medication in place prior to arrival       Activities of Daily Living    Basic Activities of Daily Living  Grooming: setup assistance stand by assistance seated  Grooming Comments: oral hygiene/hair comb  Upper Extremity Bathing: stand by assistance minimal assistance  Bathing Comments: Radha for posterior, declines bathing LB   Upper Extremity Dressing: setup assistance  Dressing Comments: gown change, brief having just been changed prior to arrival. Unable to attempt LB AE secondary to space constraints, plan to better plan at next session  General Comments: Requires LLE propped during seated bathing, KI in place, pt able to adjust straps for better fit at SBA      Instrumental Activities of Daily Living  No IADL completed on this date.    Functional Mobility  Bed Mobility:  Supine to Sit: stand by assistance, contact guard assistance  Scooting: stand by assistance  Comments:  HOB max elevated, increased time and heavy reliance on BR  Transfers:  Sit to stand transfer:minimal assistance, moderate assistance  Stand to sit transfer: minimal assistance  Stand step transfer: moderate assistance  Toilet transfer: minimal assistance, moderate assistance  Toilet transfer equipment: standard bedside commode  Toilet transfer comments: use of BSC at sink level for bathing/grooming  Comments: to RW, STS from EOB, BSC and w/c, stand-step w/c > recliner with RW, attempted stand-pivot without device but unsuccessful; required return to sitting and deferred to stand-step       Functional Mobility  Functional Mobility Activity: 8' + 5' + 15'  Device Use: rolling walker  Required Assistance: moderate assistance, requires w/c follow  Comment: cues for gait technique, pt fatigues quickly, able to maintain LLE NWB with cues, several standing rest breaks required during each bout with ARSLAN padilla, requires w/c follow  Balance:  Static Sitting Balance: good:

## 2025-05-01 NOTE — PROGRESS NOTES
[facility]    5/1/2025 12:30 PM        Name: Edna Mcclain .              Admitted: 4/24/2025  Primary Care Provider: No primary care provider on file. (Tel: None)      Had mechanical fall at home,  tripped on a cord.  Had Left femur nailing on 4/25/25  Now NWB with brace when out of bed.  Will need 6 weeks of ASA at d/c for DVT prophylaxis     Subjective:    She is up in chair. Waiting for precert for ARU.    Reviewed interval ancillary notes    Current Medications  polyethylene glycol (GLYCOLAX) packet 17 g, Daily  docusate sodium (COLACE) capsule 100 mg, Nightly PRN  metoprolol tartrate (LOPRESSOR) tablet 25 mg, BID  oxyCODONE (ROXICODONE) immediate release tablet 5 mg, Q4H PRN  calcium carbonate (TUMS) chewable tablet 500 mg, TID PRN  enoxaparin (LOVENOX) injection 40 mg, BID  hydrALAZINE (APRESOLINE) injection 10 mg, Q6H PRN  dextrose bolus 10% 125 mL, PRN   Or  dextrose bolus 10% 250 mL, PRN  glucagon injection 1 mg, PRN  dextrose 10 % infusion, Continuous PRN  insulin lispro (HUMALOG,ADMELOG) injection vial 0-4 Units, 4x Daily AC & HS  sodium chloride flush 0.9 % injection 10 mL, PRN  ondansetron (ZOFRAN) injection 4 mg, Q6H PRN  senna (SENOKOT) tablet 8.6 mg, Daily PRN  acetaminophen (TYLENOL) tablet 650 mg, Q6H PRN   Or  acetaminophen (TYLENOL) suppository 650 mg, Q6H PRN  melatonin tablet 6 mg, Nightly PRN  HYDROmorphone HCl PF (DILAUDID) injection 0.5 mg, Q4H PRN  traMADol (ULTRAM) tablet 50 mg, Q6H PRN        Objective:  BP (!) 146/76   Pulse 69   Temp 97.6 °F (36.4 °C) (Oral)   Resp 16   Ht 1.676 m (5' 6\")   Wt 124.4 kg (274 lb 4 oz)   SpO2 96%   BMI 44.27 kg/m²     Intake/Output Summary (Last 24 hours) at 5/1/2025 1230  Last data filed at 5/1/2025 0916  Gross per 24 hour   Intake 480 ml   Output 2450 ml   Net -1970 ml      Wt Readings from Last 3 Encounters:   04/26/25 124.4 kg (274 lb 4 oz)   08/13/24 121.1 kg (267 lb)   08/08/23 124.4 kg (274 lb 4 oz)

## 2025-05-02 LAB
GLUCOSE BLD-MCNC: 107 MG/DL (ref 70–99)
GLUCOSE BLD-MCNC: 109 MG/DL (ref 70–99)
GLUCOSE BLD-MCNC: 130 MG/DL (ref 70–99)
GLUCOSE BLD-MCNC: 175 MG/DL (ref 70–99)
PERFORMED ON: ABNORMAL

## 2025-05-02 PROCEDURE — 1200000000 HC SEMI PRIVATE

## 2025-05-02 PROCEDURE — 6370000000 HC RX 637 (ALT 250 FOR IP): Performed by: NURSE PRACTITIONER

## 2025-05-02 PROCEDURE — 6360000002 HC RX W HCPCS: Performed by: ORTHOPAEDIC SURGERY

## 2025-05-02 PROCEDURE — 97116 GAIT TRAINING THERAPY: CPT

## 2025-05-02 PROCEDURE — 97530 THERAPEUTIC ACTIVITIES: CPT

## 2025-05-02 PROCEDURE — 6370000000 HC RX 637 (ALT 250 FOR IP): Performed by: PHYSICIAN ASSISTANT

## 2025-05-02 PROCEDURE — 6370000000 HC RX 637 (ALT 250 FOR IP): Performed by: INTERNAL MEDICINE

## 2025-05-02 RX ADMIN — OXYCODONE 5 MG: 5 TABLET ORAL at 21:01

## 2025-05-02 RX ADMIN — ACETAMINOPHEN 650 MG: 325 TABLET ORAL at 04:29

## 2025-05-02 RX ADMIN — OXYCODONE 5 MG: 5 TABLET ORAL at 04:29

## 2025-05-02 RX ADMIN — ATORVASTATIN CALCIUM 20 MG: 20 TABLET, FILM COATED ORAL at 20:58

## 2025-05-02 RX ADMIN — ENOXAPARIN SODIUM 40 MG: 100 INJECTION SUBCUTANEOUS at 08:50

## 2025-05-02 RX ADMIN — POLYETHYLENE GLYCOL 3350 17 G: 17 POWDER, FOR SOLUTION ORAL at 13:08

## 2025-05-02 RX ADMIN — METOPROLOL TARTRATE 25 MG: 25 TABLET, FILM COATED ORAL at 08:50

## 2025-05-02 RX ADMIN — ENOXAPARIN SODIUM 40 MG: 100 INJECTION SUBCUTANEOUS at 20:58

## 2025-05-02 RX ADMIN — METOPROLOL TARTRATE 25 MG: 25 TABLET, FILM COATED ORAL at 20:58

## 2025-05-02 ASSESSMENT — PAIN SCALES - GENERAL
PAINLEVEL_OUTOF10: 2
PAINLEVEL_OUTOF10: 1
PAINLEVEL_OUTOF10: 4
PAINLEVEL_OUTOF10: 5

## 2025-05-02 ASSESSMENT — PAIN DESCRIPTION - ORIENTATION: ORIENTATION: LEFT

## 2025-05-02 ASSESSMENT — PAIN - FUNCTIONAL ASSESSMENT: PAIN_FUNCTIONAL_ASSESSMENT: ACTIVITIES ARE NOT PREVENTED

## 2025-05-02 ASSESSMENT — PAIN DESCRIPTION - LOCATION: LOCATION: KNEE;LEG;HIP

## 2025-05-02 ASSESSMENT — PAIN DESCRIPTION - PAIN TYPE: TYPE: SURGICAL PAIN

## 2025-05-02 ASSESSMENT — PAIN DESCRIPTION - ONSET: ONSET: OTHER (COMMENT)

## 2025-05-02 ASSESSMENT — PAIN DESCRIPTION - FREQUENCY: FREQUENCY: INTERMITTENT

## 2025-05-02 ASSESSMENT — PAIN DESCRIPTION - DESCRIPTORS: DESCRIPTORS: ACHING

## 2025-05-02 NOTE — PROGRESS NOTES
[facility] - Inpatient Rehabilitation Department   Phone: [phone]    Occupational Therapy    [] Initial Evaluation            [x] Daily Treatment Note         [] Discharge Summary      Patient: Edna Mcclain   : 1960   MRN: [id number]   Date of Service:  2025    Admitting Diagnosis:  Closed fracture of left distal femur (HCC)    Current Admission Summary: 64 y.o. female with a PMHx of asthma who presented to the ER for evaluation following a fall at home.   Pt reporting accidental fall over a cord plugged into the wall.  She landed directly onto her left knee.  She felt immediate pain to the area and was unable to bear weight.  Denies any preceding symptoms.       In the ER, workup was consistent with severely comminuted and displaced distal left femur fracture.     Left femur retrograde nailing     Past Medical History:  has a past medical history of Other acne and Unspecified asthma(493.90).  Past Surgical History:  has a past surgical history that includes Tonsillectomy;  section; Adenoidectomy; Breast fibroadenoma surgery (Left); and Femur Surgery (Left, 2025).    Discharge Recommendations: Edna Mcclain scored a 16/24 on the AM-PAC ADL Inpatient form. Current research shows that an AM-PAC score of 17 or less is typically not associated with a discharge to the patient's home setting. Based on the patient's AM-PAC score and their current ADL deficits, it is recommended that the patient have 5-7 sessions per week of Occupational Therapy at d/c to increase the patient's independence.  At this time, this patient demonstrates complex nursing, medical, and rehabilitative needs, and would benefit from intensive rehabilitation services upon discharge from the Inpatient setting.  This patient demonstrates the ability to participate in and benefit from an intensive therapy program with a coordinated interdisciplinary team approach to foster frequent, structured, and documented

## 2025-05-02 NOTE — PROGRESS NOTES
Edna Mcclain  5/2/2025  [id number]    Chief Complaint: Closed fracture of left distal femur (HCC)    Subjective   Since last seen, medical updates:  - None     Patient reports that she is doing well today. Pain control improving. She is frustrated to not have a response from her insurance company. She wants to get home as soon as possible to see her grandkids.        Objective   Objective:  Patient Vitals for the past 24 hrs:   BP Temp Temp src Pulse Resp SpO2   05/02/25 1648 126/80 98.3 °F (36.8 °C) Oral 72 16 97 %   05/02/25 1245 (!) 154/70 98.6 °F (37 °C) Oral 60 16 96 %   05/02/25 0845 (!) 162/70 98.4 °F (36.9 °C) Oral 71 16 96 %   05/02/25 0442 (!) 142/84 97.6 °F (36.4 °C) Oral 67 16 97 %   05/01/25 2315 130/80 98.6 °F (37 °C) Oral 61 16 94 %   05/01/25 2030 128/62 98.4 °F (36.9 °C) Oral 71 18 97 %     Gen: No distress.   HEENT: Normocephalic, atraumatic.   CV: Extremities warm, perfused.  Resp: No respiratory distress. No increased WOB  Abd: Soft, nontender nondistended  Ext: LLE in knee immobilizer. Post-op dressing in place.   Neuro:   - Alert, oriented to person/place/date.   - CN II-XII grossly intact.  - 5/5 strength in bilateral upper and lower extremities.  - 2+ reflexes bilateral biceps/triceps/BR, patellar/Achilles.  - Sensation intact to light touch throughout.   - No dysmetria on FNF or heel-shin testing bilaterally.   - Balance/gait deferred.   Psych: Calm, pleasant. Affect congruent with stated mood.     Laboratory data: Available via EMR.     Therapy progress:    PT    Rolling: Level of difficulty - A Little   Sit to Stand from a Chair: Level of difficulty - A Lot  Supine to Sit: Level of difficulty - A Little     Bed to Chair: Physical Assistance Required - A Lot  Ambulate Across Room: Physical Assistance Required - A Lot  Ascend 3-5 Steps With HR: Physical Assistance Required - A Lot    OT    Eating: Physical Assistance Required - None  Grooming: Physical Assistance Required - None  LB Dressing:

## 2025-05-02 NOTE — CARE COORDINATION
Discharge Planning:     (CM) was notified that pre-cert was denied for ARU.    CM discussed discharge with [facility] for patient. Patient and spouse stated they are waiting on self pay options from Efren Burdick.    CM team following.    Electronically signed by CHERELLE Mendes on 5/2/2025 at 5:03 PM

## 2025-05-02 NOTE — PROGRESS NOTES
[facility] - Inpatient Rehabilitation Department   Phone: [phone]    Physical Therapy    [] Initial Evaluation            [x] Daily Treatment Note         [] Discharge Summary      Patient: Edna Mcclain   : 1960   MRN: [id number]   Date of Service:  2025  Admitting Diagnosis: Closed fracture of left distal femur (HCC)  Current Admission Summary: Edna Mcclain is a 64 y.o. female with a PMHx of asthma who presented to the ER for evaluation following a fall at home.   Pt reporting accidental fall over a cord plugged into the wall.  She landed directly onto her left knee.  She felt immediate pain to the area and was unable to bear weight.  In the ER, workup was consistent with severely comminuted and displaced distal left femur fracture. S/p (L) femur retrograde IMN on .   Past Medical History:  has a past medical history of Other acne and Unspecified asthma(493.90).  Past Surgical History:  has a past surgical history that includes Tonsillectomy;  section; Adenoidectomy; Breast fibroadenoma surgery (Left); and Femur Surgery (Left, 2025).    Discharge Recommendations: Edna Mcclain scored a 13/24 on the AM-PAC short mobility form. Current research shows that an AM-PAC score of 17 or less is typically not associated with a discharge to the patient's home setting. Based on the patient's AM-PAC score and their current functional mobility deficits, it is recommended that the patient have 5-7 sessions per week of Physical Therapy at d/c to increase the patient's independence.  At this time, this patient demonstrates complex nursing, medical, and rehabilitative needs, and would benefit from intensive rehabilitation services upon discharge from the Inpatient setting.  This patient demonstrates the ability to participate in and benefit from an intensive therapy program with a coordinated interdisciplinary team approach to foster frequent, structured, and documented communication among  evaluation/education, and positioning    Goals  Patient Goals: To return home   Short Term Goals:  Time Frame: Upon discharge   Patient will complete bed mobility at contact guard assistance   Patient will complete transfers at moderate assistance   Patient will ambulate 25 ft with use of rolling walker at contact guard assistance  Patient will complete manual w/c propulsion 50 ft at stand by assistance  Patient will maintain standing balance for 2 minutes with no more than moderate assistance     *One goal added  for ambulation 5/2/25; no goals met    Above goals reviewed on 5/2/2025.  All goals are ongoing at this time unless indicated above.    Therapy Session Time      Individual Group Co-treatment   Time In     1020    Time Out     1121   Minutes     61     Timed Code Treatment Minutes: 61 Minutes    Total Treatment Minutes: 61 Minutes     Electronically Signed By: Destinee Arnett PT, DPT [id number]

## 2025-05-02 NOTE — PROGRESS NOTES
[facility] ARU      Patient interested in Self Pay for ARU.  Spoke with patient and initiated self-pay approval process per pt's request.  Awaiting communication from Finance Department regarding price per day and next steps.  CM made aware and to speak to patient about options, will continue to follow pending response from Finance.    Efren Burdick PT, DPT [id number]  5/2/25  4:07 PM

## 2025-05-02 NOTE — PROGRESS NOTES
[facility]    5/2/2025 3:41 PM        Name: Edna Mcclain .              Admitted: 4/24/2025  Primary Care Provider: No primary care provider on file. (Tel: None)      Had mechanical fall at home,  tripped on a cord.  Had Left femur nailing on 4/25/25  Now NWB with brace when out of bed.  Will need 6 weeks of ASA at d/c for DVT prophylaxis     Subjective:    She is up in chair. Has been working with therapy. Waiting for precert for ARU. No new complaints. Pain controlled. Had BM 2 days ago    Reviewed interval ancillary notes    Current Medications  atorvastatin (LIPITOR) tablet 20 mg, Nightly  polyethylene glycol (GLYCOLAX) packet 17 g, Daily  docusate sodium (COLACE) capsule 100 mg, Nightly PRN  metoprolol tartrate (LOPRESSOR) tablet 25 mg, BID  oxyCODONE (ROXICODONE) immediate release tablet 5 mg, Q4H PRN  calcium carbonate (TUMS) chewable tablet 500 mg, TID PRN  enoxaparin (LOVENOX) injection 40 mg, BID  hydrALAZINE (APRESOLINE) injection 10 mg, Q6H PRN  dextrose bolus 10% 125 mL, PRN   Or  dextrose bolus 10% 250 mL, PRN  glucagon injection 1 mg, PRN  dextrose 10 % infusion, Continuous PRN  insulin lispro (HUMALOG,ADMELOG) injection vial 0-4 Units, 4x Daily AC & HS  sodium chloride flush 0.9 % injection 10 mL, PRN  ondansetron (ZOFRAN) injection 4 mg, Q6H PRN  senna (SENOKOT) tablet 8.6 mg, Daily PRN  acetaminophen (TYLENOL) tablet 650 mg, Q6H PRN   Or  acetaminophen (TYLENOL) suppository 650 mg, Q6H PRN  melatonin tablet 6 mg, Nightly PRN  HYDROmorphone HCl PF (DILAUDID) injection 0.5 mg, Q4H PRN  traMADol (ULTRAM) tablet 50 mg, Q6H PRN        Objective:  BP (!) 154/70   Pulse 60   Temp 98.6 °F (37 °C) (Oral)   Resp 16   Ht 1.676 m (5' 6\")   Wt 124.4 kg (274 lb 4 oz)   SpO2 96%   BMI 44.27 kg/m²     Intake/Output Summary (Last 24 hours) at 5/2/2025 1541  Last data filed at 5/2/2025 1443  Gross per 24 hour   Intake 240 ml   Output 700 ml   Net -460 ml      Wt Readings

## 2025-05-03 VITALS
OXYGEN SATURATION: 98 % | SYSTOLIC BLOOD PRESSURE: 124 MMHG | RESPIRATION RATE: 16 BRPM | BODY MASS INDEX: 44.08 KG/M2 | HEIGHT: 66 IN | HEART RATE: 62 BPM | DIASTOLIC BLOOD PRESSURE: 77 MMHG | WEIGHT: 274.25 LBS | TEMPERATURE: 98.6 F

## 2025-05-03 LAB
GLUCOSE BLD-MCNC: 114 MG/DL (ref 70–99)
GLUCOSE BLD-MCNC: 127 MG/DL (ref 70–99)
GLUCOSE BLD-MCNC: 136 MG/DL (ref 70–99)
PERFORMED ON: ABNORMAL

## 2025-05-03 PROCEDURE — 97535 SELF CARE MNGMENT TRAINING: CPT

## 2025-05-03 PROCEDURE — 97530 THERAPEUTIC ACTIVITIES: CPT

## 2025-05-03 PROCEDURE — 6370000000 HC RX 637 (ALT 250 FOR IP): Performed by: NURSE PRACTITIONER

## 2025-05-03 PROCEDURE — 6360000002 HC RX W HCPCS: Performed by: ORTHOPAEDIC SURGERY

## 2025-05-03 PROCEDURE — 94760 N-INVAS EAR/PLS OXIMETRY 1: CPT

## 2025-05-03 PROCEDURE — 6370000000 HC RX 637 (ALT 250 FOR IP): Performed by: PHYSICIAN ASSISTANT

## 2025-05-03 RX ORDER — POLYETHYLENE GLYCOL 3350 17 G/17G
17 POWDER, FOR SOLUTION ORAL DAILY
COMMUNITY
Start: 2025-05-04 | End: 2025-06-03

## 2025-05-03 RX ORDER — METOPROLOL TARTRATE 25 MG/1
25 TABLET, FILM COATED ORAL 2 TIMES DAILY
Qty: 60 TABLET | Refills: 1 | Status: SHIPPED | OUTPATIENT
Start: 2025-05-03

## 2025-05-03 RX ORDER — ATORVASTATIN CALCIUM 20 MG/1
20 TABLET, FILM COATED ORAL NIGHTLY
Qty: 30 TABLET | Refills: 1 | Status: SHIPPED | OUTPATIENT
Start: 2025-05-03

## 2025-05-03 RX ORDER — OXYCODONE HYDROCHLORIDE 5 MG/1
5 TABLET ORAL EVERY 4 HOURS PRN
Qty: 18 TABLET | Refills: 0 | Status: SHIPPED | OUTPATIENT
Start: 2025-05-03 | End: 2025-05-06

## 2025-05-03 RX ADMIN — METOPROLOL TARTRATE 25 MG: 25 TABLET, FILM COATED ORAL at 08:19

## 2025-05-03 RX ADMIN — OXYCODONE 5 MG: 5 TABLET ORAL at 15:06

## 2025-05-03 RX ADMIN — POLYETHYLENE GLYCOL 3350 17 G: 17 POWDER, FOR SOLUTION ORAL at 08:19

## 2025-05-03 RX ADMIN — ENOXAPARIN SODIUM 40 MG: 100 INJECTION SUBCUTANEOUS at 08:19

## 2025-05-03 ASSESSMENT — PAIN SCALES - GENERAL
PAINLEVEL_OUTOF10: 9
PAINLEVEL_OUTOF10: 0

## 2025-05-03 ASSESSMENT — PAIN DESCRIPTION - ORIENTATION
ORIENTATION: LEFT
ORIENTATION: LEFT

## 2025-05-03 ASSESSMENT — PAIN DESCRIPTION - DESCRIPTORS
DESCRIPTORS: ACHING
DESCRIPTORS: ACHING

## 2025-05-03 ASSESSMENT — PAIN DESCRIPTION - LOCATION
LOCATION: KNEE
LOCATION: KNEE

## 2025-05-03 NOTE — PROGRESS NOTES
[facility] - Inpatient Rehabilitation Department   Phone: [phone]    Occupational Therapy    [] Initial Evaluation            [x] Daily Treatment Note         [] Discharge Summary      Patient: Edna Mcclain   : 1960   MRN: [id number]   Date of Service:  5/3/2025    Admitting Diagnosis:  Closed fracture of left distal femur (HCC)    Current Admission Summary: 64 y.o. female with a PMHx of asthma who presented to the ER for evaluation following a fall at home.   Pt reporting accidental fall over a cord plugged into the wall.  She landed directly onto her left knee.  She felt immediate pain to the area and was unable to bear weight.  Denies any preceding symptoms.       In the ER, workup was consistent with severely comminuted and displaced distal left femur fracture.     Left femur retrograde nailing     Past Medical History:  has a past medical history of Other acne and Unspecified asthma(493.90).  Past Surgical History:  has a past surgical history that includes Tonsillectomy;  section; Adenoidectomy; Breast fibroadenoma surgery (Left); and Femur Surgery (Left, 2025).    Discharge Recommendations: Edna Mcclain scored a 16/24 on the AM-PAC ADL Inpatient form. Current research shows that an AM-PAC score of 17 or less is typically not associated with a discharge to the patient's home setting. Based on the patient's AM-PAC score and their current ADL deficits, it is recommended that the patient have 5-7 sessions per week of Occupational Therapy at d/c to increase the patient's independence.  At this time, this patient demonstrates complex nursing, medical, and rehabilitative needs, and would benefit from intensive rehabilitation services upon discharge from the Inpatient setting.  This patient demonstrates the ability to participate in and benefit from an intensive therapy program with a coordinated interdisciplinary team approach to foster frequent, structured, and documented

## 2025-05-03 NOTE — CARE COORDINATION
Discharge Planning Note Re: Home Health Care     CM noted consult for discharge planning. Chart reviewed. Noted recommendations for home health care.  CM met with patient and spouse. Introduced self and explained role of CM and discharge planning.    Patient is agreeable to home health on dc.     [place] of choice list was provided with basic dialogue that supports the patient's individualized plan of care/goals, treatment preferences and shares the quality data associated with the providers. [x] Yes [] No.  Patient and spouse  had no agency of preference     Referral made to   [address],   [place] [address]  Phone: [phone]  Fax: [phone]     The referral was accept and will see patient on 5/4/25.         [facility] order for  [x]RN [x]PT  [x]OT  []HHA  []SW  []  SLP    CM will follow-up on referrals and provide any additional documentation necessary to facilitate placement.

## 2025-05-03 NOTE — PROGRESS NOTES
Shift assessment complete.  VSS, on RA, BS active, A&Ox4.  Patient concerned that insurance will not allow her to stay in the hospital following visit from .  Bruising on LLE, dressing CDI.  Neurovasc checks WNL.  Immobilizer in place.      2101: oxycodone given for pain, see MARU Mcgowan RN

## 2025-05-03 NOTE — CARE COORDINATION
Discharge Planning  CM was informed by the patient's RN and the Attending of patient's decision to go home with skilled hhc service.  Patient will also need DME for wheelchair, Walker , Bedside commode and transportation home.  MIKE spoke with Lila with [employer] [facility] [employer] [facility] [employer]-:  [phone],  Fax: [phone] regarding the DME orders who reviewed the case and reported , patient can have the wheelchair and bedside commode delivered to her home today.   Patient and spouse were  updated.

## 2025-05-03 NOTE — CARE COORDINATION
Case Management -  Discharge Note      Patient Name: Edna Mcclain                   YOB: 1960  Room: [address] [place] [address]            Readmission Risk (Low < 19, Mod (19-27), High > 27): Readmission Risk Score: 12.1    Current PCP: No primary care provider on file.      (IMM) Important Message from Medicare:    Has pt received appropriate compliance notices before being discharged if required: N/A    PT AM-PAC: 13 /24  OT AM-PAC: 16 /24    Patient/patient representative has been educated on the benefits of HHC as well as the possible risks of declining recommended services. Patient/patient representative has acknowledged the information provided and decided on the following discharge plan. Patient/ patient representative has been provided freedom of choice regarding service provider, supported by basic dialogue that supports the patient's individualized plan of care/goals.    Home Care Information:   Is patient resuming current home health care services: No    Home Care Agency:   [address],   [place] [address]  Phone: [phone]  Fax: [phone]             Services: Skilled Nursing ,PT & OT    Home Health Order Obtained: Yes    Home health agency notified of discharge.  Nurse Guan , Start of care on 5/4/25         [facility] agency notified of discharge:  [x] Yes [] No  [] NA    Family notified of discharge:  [x] Yes  [] No  [] NA    Facility notified of discharge:  [] Yes  [] No  [x] NA    Pt is being discharged with Outpt IV Antibiotics  [] Yes [] No  [] NA  If yes, make sure MAY is faxed to [facility] agency, and meds are called in to pharmacy by RN from MAY orders only.        DMEs  Wheelchair & Bedside commode via  [employer] [facility]  [address] [place] [address]  Phone:  [phone]  Fax: [phone]         Financial    Payor: AETNA / Plan: AETNA NAP CHOICE POS II / Product Type: *No Product type* /     Pharmacy:  Potential assistance

## 2025-05-03 NOTE — PROGRESS NOTES
[facility] - Inpatient Rehabilitation Department   Phone: [phone]    Physical Therapy    [] Initial Evaluation            [x] Daily Treatment Note         [] Discharge Summary      Patient: Edna Mcclain   : 1960   MRN: [id number]   Date of Service:  5/3/2025  Admitting Diagnosis: Closed fracture of left distal femur (HCC)  Current Admission Summary: Edna Mcclain is a 64 y.o. female with a PMHx of asthma who presented to the ER for evaluation following a fall at home.   Pt reporting accidental fall over a cord plugged into the wall.  She landed directly onto her left knee.  She felt immediate pain to the area and was unable to bear weight.  In the ER, workup was consistent with severely comminuted and displaced distal left femur fracture. S/p (L) femur retrograde IMN on .   Past Medical History:  has a past medical history of Other acne and Unspecified asthma(493.90).  Past Surgical History:  has a past surgical history that includes Tonsillectomy;  section; Adenoidectomy; Breast fibroadenoma surgery (Left); and Femur Surgery (Left, 2025).    Discharge Recommendations: Edna Mcclain scored a  on the AM-PAC short mobility form. Current research shows that an AM-PAC score of 17 or less is typically not associated with a discharge to the patient's home setting. Based on the patient's AM-PAC score and their current functional mobility deficits, it is recommended that the patient have 5-7 sessions per week of Physical Therapy at d/c to increase the patient's independence.  At this time, this patient demonstrates complex nursing, medical, and rehabilitative needs, and would benefit from intensive rehabilitation services upon discharge from the Inpatient setting.  This patient demonstrates the ability to participate in and benefit from an intensive therapy program with a coordinated interdisciplinary team approach to foster frequent, structured, and documented communication among

## 2025-05-03 NOTE — PLAN OF CARE
Problem: Discharge Planning  Goal: Discharge to home or other facility with appropriate resources  5/3/2025 1421 by Sandra Graf, RN  Outcome: Progressing     Problem: Pain  Goal: Verbalizes/displays adequate comfort level or baseline comfort level  5/3/2025 1421 by Sandra Graf, RN  Outcome: Progressing     Problem: Safety - Adult  Goal: Free from fall injury  5/3/2025 1421 by Sandra Graf, RN  Outcome: Progressing

## 2025-05-03 NOTE — DISCHARGE SUMMARY
Hospital Medicine Discharge Summary    Patient: Edna Mcclain     Gender: female  : 1960   Age: 64 y.o.  MRN: [id number]    Admitting Physician: Deidre Madden MD  Discharge Physician: Pat Back PA-C     Code Status: Full Code     Admit Date: 2025   Discharge Date:   5/3/25    Disposition:  Home  Time spent arranging discharge: 35 minutes    Discharge Diagnoses:    Active Hospital Problems    Diagnosis Date Noted    Closed fracture of left distal femur (HCC) [S72.402A] 2025       Recommendations:     Condition at Discharge:  Stable    Hospital Course:   Patient is a pleasant 64-year-old female who presented with left knee pain after sustaining mechanical fall.  Imaging revealed a severely comminuted intra-articular fracture of the distal femoral metaphysis involving the medial and lateral femoral condyles.  Patient was admitted and seen by orthopedic surgery.  She was taken to the operating room by Dr. De La Rosa on  where she underwent left femur retrograde nailing.  She tolerated the procedure well.  She was seen by PT OT who recommended ARU.  This was denied by her insurance.  She denied skilled nursing facility stay and elected to go home.  She was discharged home in stable condition.    Discharge Exam:    /77   Pulse 62   Temp 98.6 °F (37 °C) (Oral)   Resp 16   Ht 1.676 m (5' 6\")   Wt 124.4 kg (274 lb 4 oz)   SpO2 98%   BMI 44.27 kg/m²   General appearance:  Appears comfortable. AAOx3  HEENT: atraumatic, Pupils equal, muscous membranes moist, no masses appreciated  Cardiovascular: Regular rate and rhythm no murmurs appreciated  Respiratory: CTAB no wheezing  Gastrointestinal: Abdomen obese, soft, non-tender, BS+  EXT: immobilizer L leg  Neurology: no gross focal deficts  Psychiatry: Appropriate affect. Not agitated  Skin: Warm, dry, no rashes appreciated    Discharge Medications:   Current Discharge Medication List        START taking these medications    Details

## 2025-05-03 NOTE — PROGRESS NOTES
0730: patient stated that they do not want to go to a skilled facility and would like to go home.   CM and MD aware.       Shift assessment done, VSS, A/O. Neuro checks WNL. Denies pain at this time.  Meds given per MAR. Standard safety measures in place. The care plan and education has been reviewed and mutually agreed upon with the patient.      1441: patient to discharge with transport and go home with home health.       Electronically signed by Sandra Graf RN on 5/3/2025 at 2:42 PM

## 2025-05-09 ENCOUNTER — OFFICE VISIT (OUTPATIENT)
Dept: ORTHOPEDIC SURGERY | Age: 65
End: 2025-05-09

## 2025-05-09 VITALS — BODY MASS INDEX: 44.03 KG/M2 | WEIGHT: 274 LBS | HEIGHT: 66 IN

## 2025-05-09 DIAGNOSIS — S72.402A CLOSED FRACTURE OF DISTAL END OF LEFT FEMUR, UNSPECIFIED FRACTURE MORPHOLOGY, INITIAL ENCOUNTER (HCC): Primary | ICD-10-CM

## 2025-05-09 PROCEDURE — 99024 POSTOP FOLLOW-UP VISIT: CPT | Performed by: PHYSICIAN ASSISTANT

## 2025-05-09 NOTE — PROGRESS NOTES
Patient: Edna Mcclain                  : 1960   MRN: [id number]   Date of Visit: 25     Physician: Lan De La Rosa MD.     Reason for Visit: Status post left femur retrograde nailing    Subjective History of Present Illness:     Edna Mcclain is here for regularly scheduled follow-up s/p the above procedure. Reports they are doing well, occasional aches and pains are controlled with over the counter medications. Taking opioid medications sparingly and continuing to wean. They do not report fevers, chills or drainage from the incision site.    She has been compliant with her brace and her NWB status. She notes her pain is much improved over preop.     Physical Examination??: ?   General: Patient is alert and oriented x 3 and appears comfortable.   Incision is well-approximated, no erythema, fluctuance   Able to perform SLR   ROM deferred    SILT throughout LE     Radiographs: AP/lateral of the operative leg demonstrates post-operative changes consistent with retrograde nailing of femur. Fracture is in good position with no evidence of hardware complication or failure.      Assessment and Plan?: The patient is progressing well approximately 2 weeks s/p left femur retrograde nailing     1. A thorough discussion was had with the patient concerning the ?postoperative course and the patient is in agreement with the plan.   2. They will continue to wean from pain medications. She will remain NWB. Ok to d/c knee immobilizer today and work on light knee ROM.     3. Will see the patient in 4 weeks with repeat xrays at that time.  _______________________      Will AYDE Sweet

## 2025-06-02 ENCOUNTER — TELEPHONE (OUTPATIENT)
Dept: FAMILY MEDICINE CLINIC | Age: 65
End: 2025-06-02

## 2025-06-02 NOTE — TELEPHONE ENCOUNTER
Pt called in stating she was a former [facility] patient and she just had surgery on her femur.  She would like to know if we can call in a prescription for the metoprolol tartrate.  She said it is a blood pressure medicine and she doesn't think she can just stop taking it.  Please advise.     If we do send a prescription over she stated to have it sent to  [facility] [id number] [facility], [place] - [facility] TERRENCE HANSON

## 2025-06-06 ENCOUNTER — OFFICE VISIT (OUTPATIENT)
Dept: ORTHOPEDIC SURGERY | Age: 65
End: 2025-06-06

## 2025-06-06 VITALS — HEIGHT: 66 IN | BODY MASS INDEX: 44.03 KG/M2 | WEIGHT: 274 LBS

## 2025-06-06 DIAGNOSIS — S72.402A CLOSED FRACTURE OF DISTAL END OF LEFT FEMUR, UNSPECIFIED FRACTURE MORPHOLOGY, INITIAL ENCOUNTER (HCC): Primary | ICD-10-CM

## 2025-06-06 PROCEDURE — 99024 POSTOP FOLLOW-UP VISIT: CPT | Performed by: ORTHOPAEDIC SURGERY

## 2025-06-06 NOTE — PROGRESS NOTES
Patient: Edna Mcclain                  : 1960   MRN: [id number]   Date of Visit: 25     Physician: Lan De La Rosa MD.     Reason for Visit: Status post left femur retrograde nailing    Subjective History of Present Illness:     Edna Mcclain is here for regularly scheduled follow-up s/p the above procedure. Reports they are doing well, occasional aches and pains are controlled with over the counter medications.They do not report fevers, chills or drainage from the incision site.    She has been compliant with her brace and her NWB status. She notes her pain is much improved over preop.     Physical Examination??: ?   General: Patient is alert and oriented x 3 and appears comfortable.   Incision is well-approximated, no erythema, fluctuance   Able to perform SLR   ROM 0-90  SILT throughout LE     Radiographs: AP/lateral of the operative leg demonstrates post-operative changes consistent with retrograde nailing of femur. Fracture is in good position with no evidence of hardware complication or failure.      Assessment and Plan?: The patient is progressing well approximately 6 weeks s/p left femur retrograde nailing for distal femur fracture    1. A thorough discussion was had with the patient concerning the ?postoperative course and the patient is in agreement with the plan.     She can begin 50% WB for next 6 weeks   I will get her in physical therapy for ROM and gently stregnthening    I will see her in 6 weeks at which time she will be WBAT, unrestricted

## 2025-06-11 ENCOUNTER — HOSPITAL ENCOUNTER (OUTPATIENT)
Dept: PHYSICAL THERAPY | Age: 65
Setting detail: THERAPIES SERIES
Discharge: HOME | End: 2025-06-11
Attending: ORTHOPAEDIC SURGERY
Payer: COMMERCIAL

## 2025-06-11 DIAGNOSIS — R53.1 DECREASED STRENGTH: ICD-10-CM

## 2025-06-11 DIAGNOSIS — R52 PAIN: ICD-10-CM

## 2025-06-11 DIAGNOSIS — Z74.09 IMPAIRED FUNCTIONAL MOBILITY, BALANCE, GAIT, AND ENDURANCE: Primary | ICD-10-CM

## 2025-06-11 DIAGNOSIS — M25.60 DECREASED RANGE OF MOTION: ICD-10-CM

## 2025-06-11 DIAGNOSIS — Z74.09 STIFFNESS DUE TO IMMOBILITY: ICD-10-CM

## 2025-06-11 DIAGNOSIS — M25.60 STIFFNESS DUE TO IMMOBILITY: ICD-10-CM

## 2025-06-11 PROCEDURE — 97110 THERAPEUTIC EXERCISES: CPT

## 2025-06-11 PROCEDURE — 97161 PT EVAL LOW COMPLEX 20 MIN: CPT

## 2025-06-11 PROCEDURE — 97530 THERAPEUTIC ACTIVITIES: CPT

## 2025-06-11 NOTE — PLAN OF CARE
[facility] - Outpatient Rehabilitation and Therapy: [address], [place] [address] office: [phone] fax: [phone]     Physical Therapy Initial Evaluation Certification  Dear Lan De La Rosa MD ,    We had the pleasure of evaluating the following patient for physical therapy services at [facility] Outpatient Physical Therapy.  A summary of our findings can be found in the initial assessment below.  This includes our plan of care.  If you have any questions or concerns regarding these findings, please do not hesitate to contact me at the office phone number listed above.  Thank you for the referral.     Physician Signature:_______________________________Date:__________________  By signing above (or electronic signature), therapist’s plan is approved by physician     Physical Therapy: TREATMENT/PROGRESS NOTE   Patient: Edna Mcclain (65 y.o. female)   Examination Date: 2025   :  1960 MRN: [id number]   Visit #: 1   Insurance Allowable Auth Needed   60pcy/12 used prior []Yes    [x]No    Insurance: Payor: AETNA / Plan: AETNA NAP CHOICE POS II / Product Type: *No Product type* /   Insurance ID: [id number] - (Commercial)  Secondary Insurance (if applicable):    Treatment Diagnosis:     ICD-10-CM    1. Impaired functional mobility, balance, gait, and endurance  Z74.09       2. Decreased strength  R53.1       3. Decreased range of motion  M25.60       4. Pain  R52       5. Stiffness due to immobility  M25.60     Z74.09          Medical Diagnosis:  Closed fracture of distal end of left femur, unspecified fracture morphology, initial encounter ([facility]) [S72.402A]   Referring Physician: Lan De La Rosa MD  PCP: No primary care provider on file.     Plan of care signed (Y/N):     Date of Patient follow up with Physician:      Plan of Care Report: EVAL today  POC update due: (10 visits /OR AUTH LIMITS, whichever is less)  2025

## 2025-06-16 ENCOUNTER — HOSPITAL ENCOUNTER (OUTPATIENT)
Dept: PHYSICAL THERAPY | Age: 65
Setting detail: THERAPIES SERIES
Discharge: HOME | End: 2025-06-16
Attending: ORTHOPAEDIC SURGERY
Payer: COMMERCIAL

## 2025-06-16 PROCEDURE — 97110 THERAPEUTIC EXERCISES: CPT

## 2025-06-16 NOTE — FLOWSHEET NOTE
[facility] - Outpatient Rehabilitation and Therapy: [address], [place] [address] office: [phone] fax: [phone]     Physical Therapy: TREATMENT/PROGRESS NOTE   Patient: Edna Mcclain (65 y.o. female)   Examination Date: 2025   :  1960 MRN: [id number]   Visit #: 2   Insurance Allowable Auth Needed   60pcy/12 used prior []Yes    [x]No    Insurance: Payor: AETNA / Plan: AETNA NAP CHOICE POS II / Product Type: *No Product type* /   Insurance ID: [id number] - (Commercial)  Secondary Insurance (if applicable):    Treatment Diagnosis:     ICD-10-CM    1. Impaired functional mobility, balance, gait, and endurance  Z74.09       2. Decreased strength  R53.1       3. Decreased range of motion  M25.60       4. Pain  R52       5. Stiffness due to immobility  M25.60     Z74.09          Medical Diagnosis:  Closed fracture of distal end of left femur, unspecified fracture morphology, initial encounter ([facility]) [S72.402A]   Referring Physician: Lan De La Rosa MD  PCP: No primary care provider on file.     Plan of care signed (Y/N):     Date of Patient follow up with Physician:      Plan of Care Report: NO  POC update due: (10 visits /OR AUTH LIMITS, whichever is less)  2025                                             Medical History:  Comorbidities:  Hypertension  Osteoarthritis  Anxiety  Asthma   Relevant Medical History:                                          Precautions/ Contra-indications:           Latex allergy:  NO  Pacemaker:    NO  Contraindications for Manipulation: None  Date of Surgery: 25 - L femur retrograde nailing to address distal femur intra-articular fracture  Other: 50% WB for 6-weeks (25)    Red Flags:  None    Suicide Screening:   The patient did not verbalize a primary behavioral concern, suicidal ideation, suicidal intent, or demonstrate suicidal behaviors.    Preferred Language for Healthcare:   [x] English       []

## 2025-06-18 ENCOUNTER — HOSPITAL ENCOUNTER (OUTPATIENT)
Dept: PHYSICAL THERAPY | Age: 65
Setting detail: THERAPIES SERIES
Discharge: HOME | End: 2025-06-18
Attending: ORTHOPAEDIC SURGERY
Payer: COMMERCIAL

## 2025-06-18 PROCEDURE — 97110 THERAPEUTIC EXERCISES: CPT

## 2025-06-18 NOTE — FLOWSHEET NOTE
[facility] - Outpatient Rehabilitation and Therapy: [address], [place] [address] office: [phone] fax: [phone]     Physical Therapy: TREATMENT/PROGRESS NOTE   Patient: Edna Mcclain (65 y.o. female)   Examination Date: 2025   :  1960 MRN: [id number]   Visit #: 3   Insurance Allowable Auth Needed   60pcy/12 used prior []Yes    [x]No    Insurance: Payor: AETNA / Plan: AETNA NAP CHOICE POS II / Product Type: *No Product type* /   Insurance ID: [id number] - (Commercial)  Secondary Insurance (if applicable):    Treatment Diagnosis:     ICD-10-CM    1. Impaired functional mobility, balance, gait, and endurance  Z74.09       2. Decreased strength  R53.1       3. Decreased range of motion  M25.60       4. Pain  R52       5. Stiffness due to immobility  M25.60     Z74.09          Medical Diagnosis:  Closed fracture of distal end of left femur, unspecified fracture morphology, initial encounter ([facility]) [S72.402A]   Referring Physician: Lan De La Rosa MD  PCP: No primary care provider on file.     Plan of care signed (Y/N):     Date of Patient follow up with Physician:      Plan of Care Report: NO  POC update due: (10 visits /OR AUTH LIMITS, whichever is less)  2025                                             Medical History:  Comorbidities:  Hypertension  Osteoarthritis  Anxiety  Asthma   Relevant Medical History:                                          Precautions/ Contra-indications:           Latex allergy:  NO  Pacemaker:    NO  Contraindications for Manipulation: None  Date of Surgery: 25 - L femur retrograde nailing to address distal femur intra-articular fracture  Other: 50% WB for 6-weeks (25)    Red Flags:  None    Suicide Screening:   The patient did not verbalize a primary behavioral concern, suicidal ideation, suicidal intent, or demonstrate suicidal behaviors.    Preferred Language for Healthcare:   [x] English       []

## 2025-06-22 ASSESSMENT — PATIENT HEALTH QUESTIONNAIRE - PHQ9
1. LITTLE INTEREST OR PLEASURE IN DOING THINGS: SEVERAL DAYS
SUM OF ALL RESPONSES TO PHQ QUESTIONS 1-9: 2
2. FEELING DOWN, DEPRESSED OR HOPELESS: SEVERAL DAYS
SUM OF ALL RESPONSES TO PHQ QUESTIONS 1-9: 2
SUM OF ALL RESPONSES TO PHQ QUESTIONS 1-9: 2
2. FEELING DOWN, DEPRESSED OR HOPELESS: SEVERAL DAYS
SUM OF ALL RESPONSES TO PHQ QUESTIONS 1-9: 2
SUM OF ALL RESPONSES TO PHQ9 QUESTIONS 1 & 2: 2
1. LITTLE INTEREST OR PLEASURE IN DOING THINGS: SEVERAL DAYS

## 2025-06-23 ENCOUNTER — HOSPITAL ENCOUNTER (OUTPATIENT)
Dept: PHYSICAL THERAPY | Age: 65
Setting detail: THERAPIES SERIES
Discharge: HOME | End: 2025-06-23
Attending: ORTHOPAEDIC SURGERY
Payer: COMMERCIAL

## 2025-06-23 PROCEDURE — 97110 THERAPEUTIC EXERCISES: CPT

## 2025-06-23 NOTE — FLOWSHEET NOTE
[facility] - Outpatient Rehabilitation and Therapy: [address], [place] [address] office: [phone] fax: [phone]     Physical Therapy: TREATMENT/PROGRESS NOTE   Patient: Edna Mcclain (65 y.o. female)   Examination Date: 2025   :  1960 MRN: [id number]   Visit #: 4   Insurance Allowable Auth Needed   60pcy/12 used prior []Yes    [x]No    Insurance: Payor: AETNA / Plan: AETNA NAP CHOICE POS II / Product Type: *No Product type* /   Insurance ID: [id number] - (Commercial)  Secondary Insurance (if applicable):    Treatment Diagnosis:     ICD-10-CM    1. Impaired functional mobility, balance, gait, and endurance  Z74.09       2. Decreased strength  R53.1       3. Decreased range of motion  M25.60       4. Pain  R52       5. Stiffness due to immobility  M25.60     Z74.09          Medical Diagnosis:  Closed fracture of distal end of left femur, unspecified fracture morphology, initial encounter ([facility]) [S72.402A]   Referring Physician: Lan De La Rosa MD  PCP: No primary care provider on file.     Plan of care signed (Y/N):     Date of Patient follow up with Physician:      Plan of Care Report: NO  POC update due: (10 visits /OR AUTH LIMITS, whichever is less)  2025                                             Medical History:  Comorbidities:  Hypertension  Osteoarthritis  Anxiety  Asthma   Relevant Medical History:                                          Precautions/ Contra-indications:           Latex allergy:  NO  Pacemaker:    NO  Contraindications for Manipulation: None  Date of Surgery: 25 - L femur retrograde nailing to address distal femur intra-articular fracture  Other: 50% WB for 6-weeks (25)    Red Flags:  None    Suicide Screening:   The patient did not verbalize a primary behavioral concern, suicidal ideation, suicidal intent, or demonstrate suicidal behaviors.    Preferred Language for Healthcare:   [x] English       []

## 2025-06-25 ENCOUNTER — OFFICE VISIT (OUTPATIENT)
Dept: FAMILY MEDICINE CLINIC | Age: 65
End: 2025-06-25
Payer: COMMERCIAL

## 2025-06-25 ENCOUNTER — HOSPITAL ENCOUNTER (OUTPATIENT)
Dept: PHYSICAL THERAPY | Age: 65
Setting detail: THERAPIES SERIES
Discharge: HOME | End: 2025-06-25
Attending: ORTHOPAEDIC SURGERY
Payer: COMMERCIAL

## 2025-06-25 VITALS
WEIGHT: 274 LBS | DIASTOLIC BLOOD PRESSURE: 98 MMHG | TEMPERATURE: 97.9 F | HEART RATE: 60 BPM | BODY MASS INDEX: 44.22 KG/M2 | OXYGEN SATURATION: 98 % | SYSTOLIC BLOOD PRESSURE: 170 MMHG

## 2025-06-25 DIAGNOSIS — Z13.29 SCREENING FOR HYPOTHYROIDISM: ICD-10-CM

## 2025-06-25 DIAGNOSIS — R73.03 PREDIABETES: ICD-10-CM

## 2025-06-25 DIAGNOSIS — E66.813 CLASS 3 SEVERE OBESITY DUE TO EXCESS CALORIES WITHOUT SERIOUS COMORBIDITY WITH BODY MASS INDEX (BMI) OF 40.0 TO 44.9 IN ADULT (HCC): ICD-10-CM

## 2025-06-25 DIAGNOSIS — Z00.00 HEALTHCARE MAINTENANCE: ICD-10-CM

## 2025-06-25 DIAGNOSIS — S72.402K CLOSED FRACTURE OF DISTAL END OF LEFT FEMUR WITH NONUNION, UNSPECIFIED FRACTURE MORPHOLOGY, SUBSEQUENT ENCOUNTER: ICD-10-CM

## 2025-06-25 DIAGNOSIS — I10 PRIMARY HYPERTENSION: Primary | ICD-10-CM

## 2025-06-25 DIAGNOSIS — E78.2 MIXED HYPERLIPIDEMIA: ICD-10-CM

## 2025-06-25 DIAGNOSIS — E55.9 VITAMIN D DEFICIENCY: ICD-10-CM

## 2025-06-25 PROCEDURE — 3080F DIAST BP >= 90 MM HG: CPT | Performed by: NURSE PRACTITIONER

## 2025-06-25 PROCEDURE — 3077F SYST BP >= 140 MM HG: CPT | Performed by: NURSE PRACTITIONER

## 2025-06-25 PROCEDURE — 97110 THERAPEUTIC EXERCISES: CPT

## 2025-06-25 PROCEDURE — 99214 OFFICE O/P EST MOD 30 MIN: CPT | Performed by: NURSE PRACTITIONER

## 2025-06-25 PROCEDURE — 97530 THERAPEUTIC ACTIVITIES: CPT

## 2025-06-25 RX ORDER — METOPROLOL TARTRATE 25 MG/1
25 TABLET, FILM COATED ORAL 2 TIMES DAILY
Qty: 180 TABLET | Refills: 0 | Status: SHIPPED | OUTPATIENT
Start: 2025-06-25

## 2025-06-25 ASSESSMENT — ENCOUNTER SYMPTOMS
NAUSEA: 0
VOMITING: 0
DIARRHEA: 0
COUGH: 0
SHORTNESS OF BREATH: 0

## 2025-06-25 NOTE — PROGRESS NOTES
Edna Mcclain  : 1960  Encounter date: 2025    This is a 65 y.o. female who presents with  Chief Complaint   Patient presents with    Established New Doctor     Patient is here to establish today. No concerns.     History of present illness:    HPI   Presents to clinic today to establish care with me - former Dr. Payne patient.  She is treated for a few chronic health conditions.  She is working with Ortho due to recent fx of left femur requiring surgical intervention.  No acute concerns today.   HTN  Per patient well controlled.  Doesn't check blood pressure readings at home.  Compliant with medications.  Denies any side effects.  Denies any hypotensive episodes.  Denies any chest pain, heart palpitations, SOB w/exertion, leg swelling or headaches.  HLD  She took a statin for only about 2 weeks.  She is not interested in taking a statin in general with the concern for dementia - her mother suffered from this.  She is attempting to work on her diet.  No exercise currently due to her hx of femur fracture.   Prediabetes  Working on diet modification.   Femur Fx  Working with Ortho and PT.  She is doing well with PT.  Things are progressing.  She is still using a walker - 50% weight bearing to affected leg currently.     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      Allergies   Allergen Reactions    Sulfa Antibiotics      Current Outpatient Medications   Medication Sig Dispense Refill    metoprolol tartrate (LOPRESSOR) 25 MG tablet Take 1 tablet by mouth 2 times daily 180 tablet 0     No current facility-administered medications for this visit.      Review of Systems   Constitutional:  Negative for activity change, appetite change, chills, fatigue and fever.   Respiratory:  Negative for cough and shortness of breath.    Cardiovascular:  Negative for chest pain and palpitations.   Gastrointestinal:  Negative for diarrhea, nausea and vomiting.     Past medical, surgical, family

## 2025-06-25 NOTE — FLOWSHEET NOTE
[facility] - Outpatient Rehabilitation and Therapy: [address], [place] [address] office: [phone] fax: [phone]     Physical Therapy: TREATMENT/PROGRESS NOTE   Patient: Edna Mcclain (65 y.o. female)   Examination Date: 2025   :  1960 MRN: [id number]   Visit #: 5   Insurance Allowable Auth Needed   60pcy/12 used prior []Yes    [x]No    Insurance: Payor: AETNA / Plan: AETNA NAP CHOICE POS II / Product Type: *No Product type* /   Insurance ID: [id number] - (Commercial)  Secondary Insurance (if applicable):    Treatment Diagnosis:     ICD-10-CM    1. Impaired functional mobility, balance, gait, and endurance  Z74.09       2. Decreased strength  R53.1       3. Decreased range of motion  M25.60       4. Pain  R52       5. Stiffness due to immobility  M25.60     Z74.09          Medical Diagnosis:  Closed fracture of distal end of left femur, unspecified fracture morphology, initial encounter ([facility]) [S72.402A]   Referring Physician: Lan De La Rosa MD  PCP: Danielle Davis APRN - CNP     Plan of care signed (Y/N):     Date of Patient follow up with Physician:      Plan of Care Report: NO  POC update due: (10 visits /OR AUTH LIMITS, whichever is less)  2025                                             Medical History:  Comorbidities:  Hypertension  Osteoarthritis  Anxiety  Asthma   Relevant Medical History:                                          Precautions/ Contra-indications:           Latex allergy:  NO  Pacemaker:    NO  Contraindications for Manipulation: None  Date of Surgery: 25 - L femur retrograde nailing to address distal femur intra-articular fracture  Other: 50% WB for 6-weeks (25)    Red Flags:  None    Suicide Screening:   The patient did not verbalize a primary behavioral concern, suicidal ideation, suicidal intent, or demonstrate suicidal behaviors.    Preferred Language for Healthcare:   [x] English       [] other:    SUBJECTIVE

## 2025-06-25 NOTE — PATIENT INSTRUCTIONS
MyPlate Method.   MacrosFirst  20-30gm of protein  20-30gm of fiber per day.  60-90oz of water per day.

## 2025-06-30 ENCOUNTER — HOSPITAL ENCOUNTER (OUTPATIENT)
Dept: PHYSICAL THERAPY | Age: 65
Setting detail: THERAPIES SERIES
Discharge: HOME | End: 2025-06-30
Attending: ORTHOPAEDIC SURGERY
Payer: COMMERCIAL

## 2025-06-30 PROCEDURE — 97110 THERAPEUTIC EXERCISES: CPT

## 2025-06-30 PROCEDURE — 97530 THERAPEUTIC ACTIVITIES: CPT

## 2025-06-30 NOTE — FLOWSHEET NOTE
[facility] - Outpatient Rehabilitation and Therapy: [address], [place] [address] office: [phone] fax: [phone]     Physical Therapy: TREATMENT/PROGRESS NOTE   Patient: Edna Mcclain (65 y.o. female)   Examination Date: 2025   :  1960 MRN: [id number]   Visit #: 6   Insurance Allowable Auth Needed   60pcy/12 used prior []Yes    [x]No    Insurance: Payor: AETNA / Plan: AETNA NAP CHOICE POS II / Product Type: *No Product type* /   Insurance ID: [id number] - (Commercial)  Secondary Insurance (if applicable):    Treatment Diagnosis:     ICD-10-CM    1. Impaired functional mobility, balance, gait, and endurance  Z74.09       2. Decreased strength  R53.1       3. Decreased range of motion  M25.60       4. Pain  R52       5. Stiffness due to immobility  M25.60     Z74.09          Medical Diagnosis:  Closed fracture of distal end of left femur, unspecified fracture morphology, initial encounter ([facility]) [S72.402A]   Referring Physician: Lan De La Rosa MD  PCP: Danielle Davis APRN - CNP     Plan of care signed (Y/N):     Date of Patient follow up with Physician:      Plan of Care Report: NO  POC update due: (10 visits /OR AUTH LIMITS, whichever is less)  2025                                             Medical History:  Comorbidities:  Hypertension  Osteoarthritis  Anxiety  Asthma   Relevant Medical History:                                          Precautions/ Contra-indications:           Latex allergy:  NO  Pacemaker:    NO  Contraindications for Manipulation: None  Date of Surgery: 25 - L femur retrograde nailing to address distal femur intra-articular fracture  Other: 50% WB for 6-weeks (25)    Red Flags:  None    Suicide Screening:   The patient did not verbalize a primary behavioral concern, suicidal ideation, suicidal intent, or demonstrate suicidal behaviors.    Preferred Language for Healthcare:   [x] English       [] other:    SUBJECTIVE

## 2025-07-02 ENCOUNTER — HOSPITAL ENCOUNTER (OUTPATIENT)
Dept: PHYSICAL THERAPY | Age: 65
Setting detail: THERAPIES SERIES
Discharge: HOME | End: 2025-07-02
Attending: ORTHOPAEDIC SURGERY
Payer: COMMERCIAL

## 2025-07-02 PROCEDURE — 97110 THERAPEUTIC EXERCISES: CPT

## 2025-07-02 NOTE — FLOWSHEET NOTE
[facility] - Outpatient Rehabilitation and Therapy: [address], [place] [address] office: [phone] fax: [phone]     Physical Therapy: TREATMENT/PROGRESS NOTE   Patient: Edna Mcclain (65 y.o. female)   Examination Date: 2025   :  1960 MRN: [id number]   Visit #: 7   Insurance Allowable Auth Needed   60pcy/12 used prior []Yes    [x]No    Insurance: Payor: AETNA / Plan: AETNA NAP CHOICE POS II / Product Type: *No Product type* /   Insurance ID: [id number] - (Commercial)  Secondary Insurance (if applicable):    Treatment Diagnosis:     ICD-10-CM    1. Impaired functional mobility, balance, gait, and endurance  Z74.09       2. Decreased strength  R53.1       3. Decreased range of motion  M25.60       4. Pain  R52       5. Stiffness due to immobility  M25.60     Z74.09          Medical Diagnosis:  Closed fracture of distal end of left femur, unspecified fracture morphology, initial encounter ([facility]) [S72.402A]   Referring Physician: Lan De La Rosa MD  PCP: Danielle Davis APRN - CNP     Plan of care signed (Y/N):     Date of Patient follow up with Physician:      Plan of Care Report: NO  POC update due: (10 visits /OR AUTH LIMITS, whichever is less)  2025                                             Medical History:  Comorbidities:  Hypertension  Osteoarthritis  Anxiety  Asthma   Relevant Medical History:                                          Precautions/ Contra-indications:           Latex allergy:  NO  Pacemaker:    NO  Contraindications for Manipulation: None  Date of Surgery: 25 - L femur retrograde nailing to address distal femur intra-articular fracture  Other: 50% WB for 6-weeks (25)    Red Flags:  None    Suicide Screening:   The patient did not verbalize a primary behavioral concern, suicidal ideation, suicidal intent, or demonstrate suicidal behaviors.    Preferred Language for Healthcare:   [x] English       [] other:    SUBJECTIVE

## 2025-07-08 ENCOUNTER — HOSPITAL ENCOUNTER (OUTPATIENT)
Dept: PHYSICAL THERAPY | Age: 65
Setting detail: THERAPIES SERIES
Discharge: HOME | End: 2025-07-08
Attending: ORTHOPAEDIC SURGERY
Payer: COMMERCIAL

## 2025-07-08 PROCEDURE — 97110 THERAPEUTIC EXERCISES: CPT

## 2025-07-08 NOTE — FLOWSHEET NOTE
ICD-10 code that is of complexity and severity that require skilled therapeutic intervention. This has a direct and significant impact on the need for therapy and significantly impacts the rate of recovery.   The patient has a complexity identified by an ICD-10 code that has a direct and significant impact on the need for therapy.  (Significantly impacts the rate of recovery and is associated with a primary condition.)     Return to Play: NA    Prognosis for POC: [x] Good [] Fair  [] Poor    Patient requires continued skilled intervention: [x] Yes  [] No      CHARGE CAPTURE     PT CHARGE GRID   CPT Code (TIMED) minutes # CPT Code (UNTIMED) #     Therex (92865)  38 3  EVAL:LOW (94990 - Typically 20 minutes face-to-face)     Neuromusc. Re-ed (47502)    Re-Eval (64670)     Manual (74683)    Estim Unattended (01548)     Ther. Act (46113)    Mech. Traction (69367)     Gait (22092)    Dry Needle 1-2 muscle (72849)     Aquatic Therex (01038)    Dry Needle 3+ muscle (65521)     Iontophoresis (64667)    VASO (47364)     Ultrasound (08261)    Group Therapy (38901)     Estim Attended (00656)    Canalith Repositioning (53071)     Physical Performance Test (07874)    Custom orthotic ()     Other:    Other:    Total Timed Code Tx Minutes 38 3       Total Treatment Minutes 40      Charge Justification:  (87614) THERAPEUTIC EXERCISE - Provided verbal/tactile cueing for HEP and/or activities related to strengthening, flexibility, endurance, ROM performed to prevent loss of range of motion, maintain or improve muscular strength or increase flexibility, following either an injury or surgery.     GOALS     Patient stated goal: \"walking unassisted, return to normal activities\"   [] Progressing: [] Met: [] Not Met: [] Adjusted    Therapist goals for Patient:   Short Term Goals: To be achieved in: 2 weeks  Independent in HEP and progression per patient tolerance, in order to prevent re-injury.   [] Progressing: [] Met: [] Not Met: []

## 2025-07-10 ENCOUNTER — HOSPITAL ENCOUNTER (OUTPATIENT)
Dept: PHYSICAL THERAPY | Age: 65
Setting detail: THERAPIES SERIES
Discharge: HOME | End: 2025-07-10
Attending: ORTHOPAEDIC SURGERY
Payer: COMMERCIAL

## 2025-07-10 PROCEDURE — 97110 THERAPEUTIC EXERCISES: CPT

## 2025-07-10 PROCEDURE — 97530 THERAPEUTIC ACTIVITIES: CPT

## 2025-07-10 NOTE — FLOWSHEET NOTE
[facility] - Outpatient Rehabilitation and Therapy: [address], [place] [address] office: [phone] fax: [phone]     Physical Therapy: TREATMENT/PROGRESS NOTE   Patient: Edna Mcclain (65 y.o. female)   Examination Date: 07/10/2025   :  1960 MRN: [id number]   Visit #: 9   Insurance Allowable Auth Needed   60pcy/12 used prior []Yes    [x]No    Insurance: Payor: AETNA / Plan: AETNA NAP CHOICE POS II / Product Type: *No Product type* /   Insurance ID: [id number] - (Commercial)  Secondary Insurance (if applicable):    Treatment Diagnosis:     ICD-10-CM    1. Impaired functional mobility, balance, gait, and endurance  Z74.09       2. Decreased strength  R53.1       3. Decreased range of motion  M25.60       4. Pain  R52       5. Stiffness due to immobility  M25.60     Z74.09          Medical Diagnosis:  Closed fracture of distal end of left femur, unspecified fracture morphology, initial encounter ([facility]) [S72.402A]   Referring Physician: Lan De La Rosa MD  PCP: Danielle Davis APRN - CNP     Plan of care signed (Y/N):     Date of Patient follow up with Physician:      Plan of Care Report: NO  POC update due: (10 visits /OR AUTH LIMITS, whichever is less)  2025                                             Medical History:  Comorbidities:  Hypertension  Osteoarthritis  Anxiety  Asthma   Relevant Medical History:                                          Precautions/ Contra-indications:           Latex allergy:  NO  Pacemaker:    NO  Contraindications for Manipulation: None  Date of Surgery: 25 - L femur retrograde nailing to address distal femur intra-articular fracture  Other: 50% WB for 6-weeks (25)    Red Flags:  None    Suicide Screening:   The patient did not verbalize a primary behavioral concern, suicidal ideation, suicidal intent, or demonstrate suicidal behaviors.    Preferred Language for Healthcare:   [x] English       [] other:    SUBJECTIVE

## 2025-07-14 ENCOUNTER — HOSPITAL ENCOUNTER (OUTPATIENT)
Dept: PHYSICAL THERAPY | Age: 65
Setting detail: THERAPIES SERIES
Discharge: HOME | End: 2025-07-14
Attending: ORTHOPAEDIC SURGERY
Payer: COMMERCIAL

## 2025-07-14 PROCEDURE — 97110 THERAPEUTIC EXERCISES: CPT

## 2025-07-14 PROCEDURE — 97530 THERAPEUTIC ACTIVITIES: CPT

## 2025-07-14 NOTE — PLAN OF CARE
[facility] - Outpatient Rehabilitation and Therapy: [address], [place] [address] office: [phone] fax: [phone]    Physical Therapy Re-Certification Plan of Care    Dear Lan De La Rosa MD ,    We had the pleasure of treating the following patient for physical therapy services at [facility] Outpatient Physical Therapy. A summary of our findings can be found in the updated assessment below.  This includes our plan of care.  If you have any questions or concerns regarding these findings, please do not hesitate to contact me at the office phone number checked above.  Thank you for the referral.     Physician Signature:________________________________Date:__________________  By signing above (or electronic signature), therapist's plan is approved by physician    Total Visits: 10     Overall Response to Treatment:  The patient has attended 10 sessions of OP PT following a L femur retrograde nailing. Attendance has been consistent at 2x/week, and the patient reports good adherence to their HEP. Progress toward long-term goals has been slow but steady. The patient is currently 50% weight-bearing and ambulating with a rolling walker. Pain is well controlled, and the patient has demonstrated good tolerance to progressive loading in open kinetic chain and functional training in closed kinetic chain. Continued outpatient PT is recommended to progress weight-bearing status, normalize gait, and improve left lower extremity (LLE) strength and functional capacity to support a return to PLOF.    - L knee PROM: 0-118, Hip Flex PROM: 113  - TU.19 seconds w/ RW  - 5x Sit-to-Stand: 13.6 seconds w/ UE to assist    Recommendation:    [x] Continue PT 2x / wk for 6 weeks.   [] Hold PT, pending MD visit   [] Discharge to HEP. Follow up with PT or MD PRN.      Physical Therapy: TREATMENT/PROGRESS NOTE   Patient: Edna Mcclain (65 y.o. female)   Examination Date: 2025   :  1960 MRN:

## 2025-07-16 ENCOUNTER — HOSPITAL ENCOUNTER (OUTPATIENT)
Dept: PHYSICAL THERAPY | Age: 65
Setting detail: THERAPIES SERIES
Discharge: HOME | End: 2025-07-16
Attending: ORTHOPAEDIC SURGERY
Payer: COMMERCIAL

## 2025-07-16 PROCEDURE — 97110 THERAPEUTIC EXERCISES: CPT

## 2025-07-16 PROCEDURE — 97530 THERAPEUTIC ACTIVITIES: CPT

## 2025-07-16 NOTE — FLOWSHEET NOTE
[facility] - Outpatient Rehabilitation and Therapy: [address], [place] [address] office: [phone] fax: [phone]     Physical Therapy: TREATMENT/PROGRESS NOTE   Patient: Edna Mcclain (65 y.o. female)   Examination Date: 2025   :  1960 MRN: [id number]   Visit #: 11   Insurance Allowable Auth Needed   60pcy/12 used prior []Yes    [x]No    Insurance: Payor: AETNA / Plan: AETNA NAP CHOICE POS II / Product Type: *No Product type* /   Insurance ID: [id number] - (Commercial)  Secondary Insurance (if applicable):    Treatment Diagnosis:     ICD-10-CM    1. Impaired functional mobility, balance, gait, and endurance  Z74.09       2. Decreased strength  R53.1       3. Decreased range of motion  M25.60       4. Pain  R52       5. Stiffness due to immobility  M25.60     Z74.09          Medical Diagnosis:  Closed fracture of distal end of left femur, unspecified fracture morphology, initial encounter ([facility]) [S72.402A]   Referring Physician: Lan De La Rosa MD  PCP: Danielle Davis APRN - CNP     Plan of care signed (Y/N):     Date of Patient follow up with Physician:      Plan of Care Report: NO  POC update due: (10 visits /OR AUTH LIMITS, whichever is less)  2025                                             Medical History:  Comorbidities:  Hypertension  Osteoarthritis  Anxiety  Asthma   Relevant Medical History:                                          Precautions/ Contra-indications:           Latex allergy:  NO  Pacemaker:    NO  Contraindications for Manipulation: None  Date of Surgery: 25 - L femur retrograde nailing to address distal femur intra-articular fracture  Other: 50% WB for 6-weeks (25)    Red Flags:  None    Suicide Screening:   The patient did not verbalize a primary behavioral concern, suicidal ideation, suicidal intent, or demonstrate suicidal behaviors.    Preferred Language for Healthcare:   [x] English       [] other:    SUBJECTIVE

## 2025-07-18 ENCOUNTER — OFFICE VISIT (OUTPATIENT)
Dept: ORTHOPEDIC SURGERY | Age: 65
End: 2025-07-18

## 2025-07-18 DIAGNOSIS — S72.402A CLOSED FRACTURE OF DISTAL END OF LEFT FEMUR, UNSPECIFIED FRACTURE MORPHOLOGY, INITIAL ENCOUNTER (HCC): Primary | ICD-10-CM

## 2025-07-18 PROCEDURE — 99024 POSTOP FOLLOW-UP VISIT: CPT | Performed by: ORTHOPAEDIC SURGERY

## 2025-07-18 NOTE — PROGRESS NOTES
Patient: Edna Mcclain                  : 1960   MRN: [id number]   Date of Visit: 25     Physician: Lan De La Rosa MD.     Reason for Visit: Status post left femur retrograde nailing    Subjective History of Present Illness:     Edna Mcclain is here for regularly scheduled follow-up s/p the above procedure. Reports they are doing well, occasional aches and pains are controlled with over the counter medications.They do not report fevers, chills or drainage from the incision site.    She has been compliant with her brace and her PWB restrictions    Physical Examination??: ?   General: Patient is alert and oriented x 3 and appears comfortable.   Incision is well-approximated, no erythema, fluctuance   Able to perform SLR   ROM 0-100  SILT throughout LE     Radiographs: AP/lateral of the operative leg demonstrates post-operative changes consistent with retrograde nailing of femur. Fracture is in good position with no evidence of hardware complication or failure.      Assessment and Plan?: The patient is progressing well approximately 11 weeks s/p left femur retrograde nailing for distal femur fracture    1. A thorough discussion was had with the patient concerning the ?postoperative course and the patient is in agreement with the plan.     She can begin WBAT   I will get her in physical therapy for ROM and gently stregnthening    I will see her in 12 weeks with repeat images of the femur

## 2025-07-21 ENCOUNTER — HOSPITAL ENCOUNTER (OUTPATIENT)
Dept: PHYSICAL THERAPY | Age: 65
Setting detail: THERAPIES SERIES
Discharge: HOME | End: 2025-07-21
Attending: ORTHOPAEDIC SURGERY
Payer: COMMERCIAL

## 2025-07-21 PROCEDURE — 97110 THERAPEUTIC EXERCISES: CPT

## 2025-07-21 PROCEDURE — 97530 THERAPEUTIC ACTIVITIES: CPT

## 2025-07-21 NOTE — FLOWSHEET NOTE
EXAMINATION     Patient stated complaint: Pt reports follow-up with Dr. De La Rosa on Friday 07/18 went well. States she was cleared to transition off of the RW onto a cane. However, reports she does not feel ready for this. Pain has continued to be minimal. Legs are sore from doing a lot of walking on Saturday.     IE: Pt presents this date ~6.5 weeks post-op. Reports she was walking in her sunroom when a cord got wrapped around her ankle and she tripped and fell leading to the surgery. Has been receiving therapy at home ~2x per week.      Test used Initial score  6/11/25 07/21/2025   Pain Summary VAS 0/10 best  3/10 worst 1/10    Functional questionnaire WOMAC 22, 23% limitation 11, 11.5% limitation   Other:              Pain:  Pain location: front/inside of left hip, behind L knee   Patient describes pain to be intermittent, dull, and aching  Pain decreases with: Ice  Pain increases with: Activity and Movement, getting up and down from a chair     Living status: lives with    2 steps to get in/out of house. Now has a ramp  Lives in a 2-story. 11 steps to access bed and bath. Currently living on the main floor.     Current Functional Limitations:     PLOF:  No functional limitations  Pt's sleep is affected?   YES - sleeping in a recliner     Occupation/School:  Work/School Status: Retired  Job Duties/Demands: NA    Sport/ Recreation/ Leisure/ Hobbies:   Baby sitting grandchildren   Swimming    Review Of Systems (ROS):  [x] Performed Review of systems (Integumentary, CardioPulmonary, Neurological) by intake and observation. Intake form is in the medical record. Patient has been instructed to contact their primary care physician regarding ROS issues if not already being addressed at this time.    [x] Patient history, allergies, meds reviewed. Medical chart reviewed. See intake form.     OBJECTIVE EXAMINATION   7/21 - heavy reliance on B UE and quad avoidance with a FWD step up to a 2\"     7/16   Mvmt (norm) AROM L

## 2025-07-23 ENCOUNTER — HOSPITAL ENCOUNTER (OUTPATIENT)
Dept: PHYSICAL THERAPY | Age: 65
Setting detail: THERAPIES SERIES
Discharge: HOME | End: 2025-07-23
Attending: ORTHOPAEDIC SURGERY
Payer: COMMERCIAL

## 2025-07-23 PROCEDURE — 97110 THERAPEUTIC EXERCISES: CPT

## 2025-07-23 PROCEDURE — 97530 THERAPEUTIC ACTIVITIES: CPT

## 2025-07-23 NOTE — FLOWSHEET NOTE
by Functional Deficits.  [] Progressing: [x] Met: [] Not Met: [] Adjusted    Long Term Goals: To be achieved in: 12 weeks  Disability index score of 10% or less for the WOMAC to assist with reaching prior level of function with activities such as cooking/cleaning/bathing.  [x] Progressing: [] Met: [] Not Met: [] Adjusted  Patient will demonstrate increased L knee AROM of 0-120 without pain to allow for proper joint functioning to enable patient to don/doff shoes and socks and get in/out of car.   [x] Progressing: [] Met: [] Not Met: [] Adjusted  Patient will demonstrate increased L knee flexion/extension strength to at least 5/5 throughout without pain to allow for proper functional mobility to enable patient to ascend/descend 11 stairs to access bed and bath.   [x] Progressing: [] Met: [] Not Met: [] Adjusted  Patient will return to ambulating without an % of the time without increased symptoms or restriction.   [x] Progressing: [] Met: [] Not Met: [] Adjusted  Patient TUG to be less than 12-seconds to promote rapid, short-distance mobility and decreased risk of falls.   [x] Progressing: [] Met: [] Not Met: [] Adjusted     Overall Progression Towards Functional goals/ Treatment Progress Update:  [] Patient is progressing as expected towards functional goals listed.    [] Progression is slowed due to complexities/Impairments listed.  [] Progression has been slowed due to co-morbidities.  [x] Plan just implemented, too soon (<30days) to assess goals progression   [] Goals require adjustment due to lack of progress  [] Patient is not progressing as expected and requires additional follow up with physician  [] Other:     TREATMENT PLAN     Frequency/Duration: 2x/week for 6 weeks for the following treatment interventions:    Interventions:  Therapeutic Exercise (07868) including: strength training, ROM, and functional mobility  Therapeutic Activities (66822) including: functional mobility training and

## 2025-07-28 ENCOUNTER — HOSPITAL ENCOUNTER (OUTPATIENT)
Dept: PHYSICAL THERAPY | Age: 65
Setting detail: THERAPIES SERIES
Discharge: HOME | End: 2025-07-28
Attending: ORTHOPAEDIC SURGERY
Payer: COMMERCIAL

## 2025-07-28 PROCEDURE — 97530 THERAPEUTIC ACTIVITIES: CPT

## 2025-07-28 PROCEDURE — 97110 THERAPEUTIC EXERCISES: CPT

## 2025-07-28 NOTE — FLOWSHEET NOTE
[facility] - Outpatient Rehabilitation and Therapy: [address], [place] [address] office: [phone] fax: [phone]     Physical Therapy: TREATMENT/PROGRESS NOTE   Patient: Edna Mcclain (65 y.o. female)   Examination Date: 2025   :  1960 MRN: [id number]   Visit #: 14   Insurance Allowable Auth Needed   60pcy/12 used prior []Yes    [x]No    Insurance: Payor: [employer] / Plan: [employer] NAP CHOICE POS II / Product Type: *No Product type* /   Insurance ID: [id number] - (Commercial)  Secondary Insurance (if applicable):    Treatment Diagnosis:     ICD-10-CM    1. Impaired functional mobility, balance, gait, and endurance  Z74.09       2. Decreased strength  R53.1       3. Decreased range of motion  M25.60       4. Pain  R52       5. Stiffness due to immobility  M25.60     Z74.09          Medical Diagnosis:  Closed fracture of distal end of left femur, unspecified fracture morphology, initial encounter ([facility]) [S72.402A]   Referring Physician: Lan De La Rosa MD  PCP: Danielle Davis APRN - CNP     Plan of care signed (Y/N):     Date of Patient follow up with Physician:      Plan of Care Report: NO  POC update due: (10 visits /OR AUTH LIMITS, whichever is less)  2025                                             Medical History:  Comorbidities:  Hypertension  Osteoarthritis  Anxiety  Asthma   Relevant Medical History:                                          Precautions/ Contra-indications:           Latex allergy:  NO  Pacemaker:    NO  Contraindications for Manipulation: None  Date of Surgery: 25 - L femur retrograde nailing to address distal femur intra-articular fracture  Other: 50% WB for 6-weeks (25)    Red Flags:  None    Suicide Screening:   The patient did not verbalize a primary behavioral concern, suicidal ideation, suicidal intent, or demonstrate suicidal behaviors.    Preferred Language for Healthcare:   [x] English       [] other:    SUBJECTIVE

## 2025-07-30 ENCOUNTER — HOSPITAL ENCOUNTER (OUTPATIENT)
Dept: PHYSICAL THERAPY | Age: 65
Setting detail: THERAPIES SERIES
Discharge: HOME | End: 2025-07-30
Attending: ORTHOPAEDIC SURGERY
Payer: COMMERCIAL

## 2025-07-30 PROCEDURE — 97110 THERAPEUTIC EXERCISES: CPT

## 2025-07-30 PROCEDURE — 97530 THERAPEUTIC ACTIVITIES: CPT

## 2025-07-30 NOTE — FLOWSHEET NOTE
severity that require skilled therapeutic intervention. This has a direct and significant impact on the need for therapy and significantly impacts the rate of recovery.   The patient has a complexity identified by an ICD-10 code that has a direct and significant impact on the need for therapy.  (Significantly impacts the rate of recovery and is associated with a primary condition.)     Return to Play: NA    Prognosis for POC: [x] Good [] Fair  [] Poor    Patient requires continued skilled intervention: [x] Yes  [] No      CHARGE CAPTURE     PT CHARGE GRID   CPT Code (TIMED) minutes # CPT Code (UNTIMED) #     Therex (51993)  15 1  EVAL:LOW (91396 - Typically 20 minutes face-to-face)     Neuromusc. Re-ed (84016)    Re-Eval (03044)     Manual (89340)    Estim Unattended (93746)     Ther. Act (60189) 28 2  Mech. Traction (56569)     Gait (12717)    Dry Needle 1-2 muscle (13089)     Aquatic Therex (58596)    Dry Needle 3+ muscle (73331)     Iontophoresis (36894)    VASO (15837)     Ultrasound (86962)    Group Therapy (42839)     Estim Attended (27515)    Canalith Repositioning (80037)     Physical Performance Test (50993)    Custom orthotic ()     Other:    Other:    Total Timed Code Tx Minutes 43 3       Total Treatment Minutes 43      Charge Justification:  (32386) THERAPEUTIC EXERCISE - Provided verbal/tactile cueing for HEP and/or activities related to strengthening, flexibility, endurance, ROM performed to prevent loss of range of motion, maintain or improve muscular strength or increase flexibility, following either an injury or surgery.   (91401) THERAPEUTIC ACTIVITY - use of dynamic activities to improve functional performance. (Ex include squatting, ascending/descending stairs, walking, bending, lifting, catching, throwing, pushing, pulling, jumping.)  Direct, one on one contact, billed in 15-minute increments.    GOALS     Patient stated goal: \"walking unassisted, return to normal activities\"   [x]

## 2025-08-03 SDOH — HEALTH STABILITY: PHYSICAL HEALTH: ON AVERAGE, HOW MANY MINUTES DO YOU ENGAGE IN EXERCISE AT THIS LEVEL?: 0 MIN

## 2025-08-03 SDOH — HEALTH STABILITY: PHYSICAL HEALTH
ON AVERAGE, HOW MANY DAYS PER WEEK DO YOU ENGAGE IN MODERATE TO STRENUOUS EXERCISE (LIKE A BRISK WALK)?: PATIENT DECLINED

## 2025-08-04 ENCOUNTER — HOSPITAL ENCOUNTER (OUTPATIENT)
Dept: PHYSICAL THERAPY | Age: 65
Setting detail: THERAPIES SERIES
Discharge: HOME | End: 2025-08-04
Attending: ORTHOPAEDIC SURGERY
Payer: COMMERCIAL

## 2025-08-04 DIAGNOSIS — S72.402K CLOSED FRACTURE OF DISTAL END OF LEFT FEMUR WITH NONUNION, UNSPECIFIED FRACTURE MORPHOLOGY, SUBSEQUENT ENCOUNTER: ICD-10-CM

## 2025-08-04 DIAGNOSIS — Z13.29 SCREENING FOR HYPOTHYROIDISM: ICD-10-CM

## 2025-08-04 DIAGNOSIS — R73.03 PREDIABETES: ICD-10-CM

## 2025-08-04 DIAGNOSIS — E66.813 CLASS 3 SEVERE OBESITY DUE TO EXCESS CALORIES WITHOUT SERIOUS COMORBIDITY WITH BODY MASS INDEX (BMI) OF 40.0 TO 44.9 IN ADULT (HCC): ICD-10-CM

## 2025-08-04 DIAGNOSIS — E78.2 MIXED HYPERLIPIDEMIA: ICD-10-CM

## 2025-08-04 DIAGNOSIS — I10 PRIMARY HYPERTENSION: ICD-10-CM

## 2025-08-04 DIAGNOSIS — E55.9 VITAMIN D DEFICIENCY: ICD-10-CM

## 2025-08-04 DIAGNOSIS — Z00.00 HEALTHCARE MAINTENANCE: ICD-10-CM

## 2025-08-04 PROCEDURE — 97110 THERAPEUTIC EXERCISES: CPT

## 2025-08-04 PROCEDURE — 97530 THERAPEUTIC ACTIVITIES: CPT

## 2025-08-05 LAB
25(OH)D3 SERPL-MCNC: 23 NG/ML
ALBUMIN SERPL-MCNC: 4.4 G/DL (ref 3.4–5)
ALBUMIN/GLOB SERPL: 1.9 {RATIO} (ref 1.1–2.2)
ALP SERPL-CCNC: 95 U/L (ref 40–129)
ALT SERPL-CCNC: 23 U/L (ref 10–40)
ANION GAP SERPL CALCULATED.3IONS-SCNC: 9 MMOL/L (ref 3–16)
AST SERPL-CCNC: 23 U/L (ref 15–37)
BASOPHILS # BLD: 0 K/UL (ref 0–0.2)
BASOPHILS NFR BLD: 0.6 %
BILIRUB SERPL-MCNC: 1.5 MG/DL (ref 0–1)
BUN SERPL-MCNC: 12 MG/DL (ref 7–20)
CALCIUM SERPL-MCNC: 9.3 MG/DL (ref 8.3–10.6)
CHLORIDE SERPL-SCNC: 104 MMOL/L (ref 99–110)
CHOLEST SERPL-MCNC: 265 MG/DL (ref 0–199)
CO2 SERPL-SCNC: 29 MMOL/L (ref 21–32)
CREAT SERPL-MCNC: 0.7 MG/DL (ref 0.6–1.2)
DEPRECATED RDW RBC AUTO: 13.6 % (ref 12.4–15.4)
EOSINOPHIL # BLD: 0.1 K/UL (ref 0–0.6)
EOSINOPHIL NFR BLD: 1.9 %
EST. AVERAGE GLUCOSE BLD GHB EST-MCNC: 116.9 MG/DL
GFR SERPLBLD CREATININE-BSD FMLA CKD-EPI: >90 ML/MIN/{1.73_M2}
GLUCOSE P FAST SERPL-MCNC: 97 MG/DL (ref 70–99)
HBA1C MFR BLD: 5.7 %
HCT VFR BLD AUTO: 41.8 % (ref 36–48)
HDLC SERPL-MCNC: 52 MG/DL (ref 40–60)
HGB BLD-MCNC: 14.6 G/DL (ref 12–16)
LDL CHOLESTEROL: 190 MG/DL
LYMPHOCYTES # BLD: 2.3 K/UL (ref 1–5.1)
LYMPHOCYTES NFR BLD: 28.9 %
MCH RBC QN AUTO: 31.1 PG (ref 26–34)
MCHC RBC AUTO-ENTMCNC: 34.9 G/DL (ref 31–36)
MCV RBC AUTO: 89.2 FL (ref 80–100)
MONOCYTES # BLD: 0.5 K/UL (ref 0–1.3)
MONOCYTES NFR BLD: 6.9 %
NEUTROPHILS # BLD: 4.9 K/UL (ref 1.7–7.7)
NEUTROPHILS NFR BLD: 61.7 %
PLATELET # BLD AUTO: 243 K/UL (ref 135–450)
PMV BLD AUTO: 8.9 FL (ref 5–10.5)
POTASSIUM SERPL-SCNC: 4.2 MMOL/L (ref 3.5–5.1)
PROT SERPL-MCNC: 6.7 G/DL (ref 6.4–8.2)
RBC # BLD AUTO: 4.69 M/UL (ref 4–5.2)
SODIUM SERPL-SCNC: 142 MMOL/L (ref 136–145)
TRIGL SERPL-MCNC: 114 MG/DL (ref 0–150)
TSH SERPL DL<=0.005 MIU/L-ACNC: 2.52 UIU/ML (ref 0.27–4.2)
VLDLC SERPL CALC-MCNC: 23 MG/DL
WBC # BLD AUTO: 7.9 K/UL (ref 4–11)

## 2025-08-06 ENCOUNTER — HOSPITAL ENCOUNTER (OUTPATIENT)
Dept: PHYSICAL THERAPY | Age: 65
Setting detail: THERAPIES SERIES
Discharge: HOME | End: 2025-08-06
Attending: ORTHOPAEDIC SURGERY
Payer: COMMERCIAL

## 2025-08-06 PROCEDURE — 97110 THERAPEUTIC EXERCISES: CPT

## 2025-08-06 PROCEDURE — 97530 THERAPEUTIC ACTIVITIES: CPT

## 2025-08-07 ENCOUNTER — OFFICE VISIT (OUTPATIENT)
Age: 65
End: 2025-08-07
Payer: COMMERCIAL

## 2025-08-07 VITALS
HEART RATE: 57 BPM | OXYGEN SATURATION: 98 % | BODY MASS INDEX: 42.01 KG/M2 | WEIGHT: 261.4 LBS | DIASTOLIC BLOOD PRESSURE: 82 MMHG | HEIGHT: 66 IN | TEMPERATURE: 98 F | SYSTOLIC BLOOD PRESSURE: 180 MMHG

## 2025-08-07 DIAGNOSIS — E78.2 MIXED HYPERLIPIDEMIA: ICD-10-CM

## 2025-08-07 DIAGNOSIS — I10 PRIMARY HYPERTENSION: Primary | ICD-10-CM

## 2025-08-07 PROCEDURE — 99213 OFFICE O/P EST LOW 20 MIN: CPT | Performed by: FAMILY MEDICINE

## 2025-08-07 PROCEDURE — 3079F DIAST BP 80-89 MM HG: CPT | Performed by: FAMILY MEDICINE

## 2025-08-07 PROCEDURE — 3077F SYST BP >= 140 MM HG: CPT | Performed by: FAMILY MEDICINE

## 2025-08-07 ASSESSMENT — PATIENT HEALTH QUESTIONNAIRE - PHQ9
7. TROUBLE CONCENTRATING ON THINGS, SUCH AS READING THE NEWSPAPER OR WATCHING TELEVISION: NOT AT ALL
SUM OF ALL RESPONSES TO PHQ QUESTIONS 1-9: 0
SUM OF ALL RESPONSES TO PHQ QUESTIONS 1-9: 0
8. MOVING OR SPEAKING SO SLOWLY THAT OTHER PEOPLE COULD HAVE NOTICED. OR THE OPPOSITE, BEING SO FIGETY OR RESTLESS THAT YOU HAVE BEEN MOVING AROUND A LOT MORE THAN USUAL: NOT AT ALL
4. FEELING TIRED OR HAVING LITTLE ENERGY: NOT AT ALL
10. IF YOU CHECKED OFF ANY PROBLEMS, HOW DIFFICULT HAVE THESE PROBLEMS MADE IT FOR YOU TO DO YOUR WORK, TAKE CARE OF THINGS AT HOME, OR GET ALONG WITH OTHER PEOPLE: NOT DIFFICULT AT ALL
SUM OF ALL RESPONSES TO PHQ QUESTIONS 1-9: 0
3. TROUBLE FALLING OR STAYING ASLEEP: NOT AT ALL
6. FEELING BAD ABOUT YOURSELF - OR THAT YOU ARE A FAILURE OR HAVE LET YOURSELF OR YOUR FAMILY DOWN: NOT AT ALL
5. POOR APPETITE OR OVEREATING: NOT AT ALL
2. FEELING DOWN, DEPRESSED OR HOPELESS: NOT AT ALL
1. LITTLE INTEREST OR PLEASURE IN DOING THINGS: NOT AT ALL
SUM OF ALL RESPONSES TO PHQ QUESTIONS 1-9: 0
9. THOUGHTS THAT YOU WOULD BE BETTER OFF DEAD, OR OF HURTING YOURSELF: NOT AT ALL

## 2025-08-07 ASSESSMENT — ENCOUNTER SYMPTOMS
ABDOMINAL PAIN: 0
BLOOD IN STOOL: 0
RHINORRHEA: 0
SHORTNESS OF BREATH: 0
BACK PAIN: 0
COUGH: 0
DIARRHEA: 0
SORE THROAT: 0
CONSTIPATION: 0
VOMITING: 0
NAUSEA: 0
WHEEZING: 0

## 2025-08-11 ENCOUNTER — HOSPITAL ENCOUNTER (OUTPATIENT)
Dept: PHYSICAL THERAPY | Age: 65
Setting detail: THERAPIES SERIES
Discharge: HOME | End: 2025-08-11
Attending: ORTHOPAEDIC SURGERY
Payer: COMMERCIAL

## 2025-08-11 PROCEDURE — 97530 THERAPEUTIC ACTIVITIES: CPT

## 2025-08-11 PROCEDURE — 97110 THERAPEUTIC EXERCISES: CPT

## 2025-08-11 PROCEDURE — 97112 NEUROMUSCULAR REEDUCATION: CPT

## 2025-08-13 ENCOUNTER — HOSPITAL ENCOUNTER (OUTPATIENT)
Dept: PHYSICAL THERAPY | Age: 65
Setting detail: THERAPIES SERIES
Discharge: HOME | End: 2025-08-13
Attending: ORTHOPAEDIC SURGERY
Payer: COMMERCIAL

## 2025-08-13 PROCEDURE — 97110 THERAPEUTIC EXERCISES: CPT

## 2025-08-13 PROCEDURE — 97112 NEUROMUSCULAR REEDUCATION: CPT

## 2025-08-13 PROCEDURE — 97530 THERAPEUTIC ACTIVITIES: CPT

## 2025-08-18 ENCOUNTER — HOSPITAL ENCOUNTER (OUTPATIENT)
Dept: PHYSICAL THERAPY | Age: 65
Setting detail: THERAPIES SERIES
Discharge: HOME | End: 2025-08-18
Attending: ORTHOPAEDIC SURGERY
Payer: COMMERCIAL

## 2025-08-18 PROCEDURE — 97110 THERAPEUTIC EXERCISES: CPT

## 2025-08-18 PROCEDURE — 97530 THERAPEUTIC ACTIVITIES: CPT

## 2025-08-20 ENCOUNTER — HOSPITAL ENCOUNTER (OUTPATIENT)
Dept: PHYSICAL THERAPY | Age: 65
Setting detail: THERAPIES SERIES
Discharge: HOME | End: 2025-08-20
Attending: ORTHOPAEDIC SURGERY
Payer: COMMERCIAL

## 2025-08-20 PROCEDURE — 97530 THERAPEUTIC ACTIVITIES: CPT

## 2025-08-20 PROCEDURE — 97110 THERAPEUTIC EXERCISES: CPT

## 2025-08-25 ENCOUNTER — HOSPITAL ENCOUNTER (OUTPATIENT)
Dept: PHYSICAL THERAPY | Age: 65
Setting detail: THERAPIES SERIES
Discharge: HOME | End: 2025-08-25
Attending: ORTHOPAEDIC SURGERY
Payer: COMMERCIAL

## 2025-08-25 PROCEDURE — 97530 THERAPEUTIC ACTIVITIES: CPT

## 2025-08-25 PROCEDURE — 97110 THERAPEUTIC EXERCISES: CPT

## 2025-08-27 ENCOUNTER — APPOINTMENT (OUTPATIENT)
Dept: PHYSICAL THERAPY | Age: 65
End: 2025-08-27
Attending: ORTHOPAEDIC SURGERY
Payer: COMMERCIAL

## 2025-08-29 ENCOUNTER — HOSPITAL ENCOUNTER (OUTPATIENT)
Dept: PHYSICAL THERAPY | Age: 65
Setting detail: THERAPIES SERIES
Discharge: HOME | End: 2025-08-29
Attending: ORTHOPAEDIC SURGERY
Payer: COMMERCIAL

## 2025-08-29 PROCEDURE — 97110 THERAPEUTIC EXERCISES: CPT

## 2025-08-29 PROCEDURE — 97530 THERAPEUTIC ACTIVITIES: CPT

## 2025-09-04 ENCOUNTER — HOSPITAL ENCOUNTER (OUTPATIENT)
Dept: PHYSICAL THERAPY | Age: 65
Setting detail: THERAPIES SERIES
Discharge: HOME | End: 2025-09-04
Attending: ORTHOPAEDIC SURGERY
Payer: COMMERCIAL

## 2025-09-04 PROCEDURE — 97110 THERAPEUTIC EXERCISES: CPT

## 2025-09-04 PROCEDURE — 97530 THERAPEUTIC ACTIVITIES: CPT

## (undated) DEVICE — ZIMMER® STERILE DISPOSABLE TOURNIQUET CUFF WITH PLC, DUAL PORT, SINGLE BLADDER, 34 IN. (86 CM)

## (undated) DEVICE — BANDAGE,ELASTIC,ESMARK,STERILE,6"X9',LF: Brand: MEDLINE

## (undated) DEVICE — SUTURE ABSORBABLE MONOFILAMENT 1 MO-4 36 CM 36 MM VIO PDS +

## (undated) DEVICE — INTENDED FOR TISSUE SEPARATION, AND OTHER PROCEDURES THAT REQUIRE A SHARP SURGICAL BLADE TO PUNCTURE OR CUT.: Brand: BARD-PARKER ® STAINLESS STEEL BLADES

## (undated) DEVICE — SUTURE MONOCRYL PLUS UNDYED PS 3-0 45CM STRATAFIX SPIRAL

## (undated) DEVICE — STERILE POLYISOPRENE POWDER-FREE SURGICAL GLOVES WITH EMOLLIENT COATING: Brand: PROTEXIS

## (undated) DEVICE — BANDAGE COMPR W6INXL10YD ST M E WHITE/BEIGE

## (undated) DEVICE — DRESSING,GAUZE,XEROFORM,CURAD,5"X9",ST: Brand: CURAD

## (undated) DEVICE — Device

## (undated) DEVICE — TOWEL,OR,DSP,ST,BLUE,STD,6/PK,12PK/CS: Brand: MEDLINE

## (undated) DEVICE — SYRINGE MED 10ML LUERLOCK TIP W/O SFTY DISP

## (undated) DEVICE — CANNULATED SCREW
Type: IMPLANTABLE DEVICE | Site: LEG | Status: NON-FUNCTIONAL
Brand: ASNIS
Removed: 2025-04-25

## (undated) DEVICE — C-ARMOR C-ARM EQUIPMENT COVERS CLEAR STERILE UNIVERSAL FIT 12 PER CASE: Brand: C-ARMOR

## (undated) DEVICE — COVER LT HNDL BLU PLAS

## (undated) DEVICE — 3M™ STERI-DRAPE™ U-DRAPE 1015: Brand: STERI-DRAPE™

## (undated) DEVICE — SUTURE VICRYL SZ 0 L36IN ABSRB UD L36MM CT-1 1/2 CIR J946H

## (undated) DEVICE — COVER,MAYO STAND,XL,STERILE: Brand: MEDLINE

## (undated) DEVICE — ORIF & ANTERIOR HIP: Brand: MEDLINE INDUSTRIES, INC.

## (undated) DEVICE — PENCIL SMK EVAC TELSCP 3 M TBNG

## (undated) DEVICE — CANNULATED DRILL

## (undated) DEVICE — APPLICATOR MEDICATED 26 CC SOLUTION HI LT ORNG CHLORAPREP

## (undated) DEVICE — T-DRAPE,EXTREMITY,STERILE: Brand: MEDLINE

## (undated) DEVICE — HYPODERMIC SAFETY NEEDLE: Brand: MAGELLAN

## (undated) DEVICE — REAMER SHAFT, MOD.TRINKLE: Brand: BIXCUT

## (undated) DEVICE — 3D ISLAND DRESSING 4IN X 12IN: Brand: THERABOND 3D ANTIMICROBIAL BARRIER SYSTEMS

## (undated) DEVICE — SUTURE VICRYL + SZ 1 L36IN ABSRB UD L36MM CT-1 1/2 CIR VCP947H

## (undated) DEVICE — TOTAL TRAY, DB, 100% SILI FOLEY, 16FR 10: Brand: MEDLINE

## (undated) DEVICE — DRAPE,U/ SHT,SPLIT,PLAS,STERIL: Brand: MEDLINE

## (undated) DEVICE — BANDAGE COBAN 6 IN WND 6INX5YD FOAM

## (undated) DEVICE — ELECTRODE ELECSURG L 10.2 CM PTFE COAT MONOPOLAR BLADE OPN

## (undated) DEVICE — GLOVE ORANGE PI 7   MSG9070

## (undated) DEVICE — SUTURE VICRYL + SZ 2-0 L36IN ABSRB UD L36MM CT-1 1/2 CIR VCP945H

## (undated) DEVICE — STERILE TOTAL KNEE DRAPE PACK: Brand: CARDINAL HEALTH

## (undated) DEVICE — BOWL MED L 32OZ PLAS W/ MOLD GRAD EZ OPN PEEL PCH

## (undated) DEVICE — GLOVE ORANGE PI 8   MSG9080

## (undated) DEVICE — COTTON UNDERCAST PADDING,CRIMPED FINISH: Brand: WEBRIL

## (undated) DEVICE — GAUZE,SPONGE,4"X4",8PLY,STRL,LF,10/TRAY: Brand: MEDLINE

## (undated) DEVICE — STOCKINETTE,IMPERVIOUS,12X48,STERILE: Brand: MEDLINE

## (undated) DEVICE — SYRINGE IRRIG 60ML SFT PLIABLE BLB EZ TO GRP 1 HND USE W/

## (undated) DEVICE — UNTHREADED GUIDE WIRE: Brand: FIXOS

## (undated) DEVICE — BIT DRL L 360 MM DIA 4.2 MM LCK STRL T2 ALPHA

## (undated) DEVICE — K-WIRE
Type: IMPLANTABLE DEVICE | Site: LEG | Status: NON-FUNCTIONAL
Removed: 2025-04-25

## (undated) DEVICE — ADVANCED LOCKING SCREW
Type: IMPLANTABLE DEVICE | Site: LEG | Status: NON-FUNCTIONAL
Removed: 2025-04-25

## (undated) DEVICE — YANKAUER,OPEN TIP,W/O VENT,STERILE: Brand: MEDLINE INDUSTRIES, INC.

## (undated) DEVICE — K-WIRE, STERILE
Type: IMPLANTABLE DEVICE | Site: LEG | Status: NON-FUNCTIONAL
Removed: 2025-04-25

## (undated) DEVICE — 3M™ TEGADERM™ TRANSPARENT FILM DRESSING FRAME STYLE, 1628, 6 IN X 8 IN (15 CM X 20 CM), 10/CT 8CT/CASE: Brand: 3M™ TEGADERM™

## (undated) DEVICE — STAPLER SKIN H3.9MM WIRE DIA0.58MM CRWN 6.9MM 35 STPL ROT